# Patient Record
Sex: FEMALE | Race: WHITE | NOT HISPANIC OR LATINO | Employment: FULL TIME | URBAN - METROPOLITAN AREA
[De-identification: names, ages, dates, MRNs, and addresses within clinical notes are randomized per-mention and may not be internally consistent; named-entity substitution may affect disease eponyms.]

---

## 2017-01-16 ENCOUNTER — HOSPITAL ENCOUNTER (OUTPATIENT)
Dept: NON INVASIVE DIAGNOSTICS | Facility: HOSPITAL | Age: 52
Discharge: HOME | End: 2017-01-16
Payer: COMMERCIAL

## 2017-01-16 DIAGNOSIS — R07.89 CHEST TIGHTNESS: ICD-10-CM

## 2017-01-16 PROCEDURE — 93306 TTE W/DOPPLER COMPLETE: CPT

## 2017-02-16 ENCOUNTER — ALLSCRIPTS OFFICE VISIT (OUTPATIENT)
Dept: OTHER | Facility: OTHER | Age: 52
End: 2017-02-16

## 2017-02-19 ENCOUNTER — GENERIC CONVERSION - ENCOUNTER (OUTPATIENT)
Dept: OTHER | Facility: OTHER | Age: 52
End: 2017-02-19

## 2017-02-19 ENCOUNTER — HOSPITAL ENCOUNTER (EMERGENCY)
Facility: HOSPITAL | Age: 52
Discharge: HOME | End: 2017-02-19
Attending: EMERGENCY MEDICINE | Admitting: EMERGENCY MEDICINE
Payer: COMMERCIAL

## 2017-02-19 VITALS
RESPIRATION RATE: 16 BRPM | BODY MASS INDEX: 23.05 KG/M2 | OXYGEN SATURATION: 98 % | HEART RATE: 100 BPM | DIASTOLIC BLOOD PRESSURE: 76 MMHG | WEIGHT: 135 LBS | TEMPERATURE: 98.2 F | HEIGHT: 64 IN | SYSTOLIC BLOOD PRESSURE: 133 MMHG

## 2017-02-19 DIAGNOSIS — J02.8 SORE THROAT (VIRAL): Primary | ICD-10-CM

## 2017-02-19 DIAGNOSIS — B97.89 SORE THROAT (VIRAL): Primary | ICD-10-CM

## 2017-02-19 LAB — S PYO AG THROAT QL: NEGATIVE

## 2017-02-19 PROCEDURE — 87070 CULTURE OTHR SPECIMN AEROBIC: CPT | Performed by: EMERGENCY MEDICINE

## 2017-02-19 PROCEDURE — 87430 STREP A AG IA: CPT | Performed by: EMERGENCY MEDICINE

## 2017-02-19 PROCEDURE — A9270 NON-COVERED ITEM OR SERVICE: HCPCS | Performed by: EMERGENCY MEDICINE

## 2017-02-19 PROCEDURE — 99283 EMERGENCY DEPT VISIT LOW MDM: CPT

## 2017-02-19 RX ORDER — NAPROXEN 500 MG/1
500 TABLET ORAL 2 TIMES DAILY WITH MEALS
Qty: 10 TABLET | Refills: 0 | Status: SHIPPED | OUTPATIENT
Start: 2017-02-19 | End: 2018-11-29

## 2017-02-19 RX ORDER — NAPROXEN 500 MG/1
500 TABLET ORAL ONCE
Status: COMPLETED | OUTPATIENT
Start: 2017-02-19 | End: 2017-02-19

## 2017-02-19 RX ORDER — HYDROCODONE BITARTRATE AND ACETAMINOPHEN 5; 325 MG/1; MG/1
1 TABLET ORAL EVERY 8 HOURS PRN
Qty: 10 TABLET | Refills: 0 | Status: SHIPPED | OUTPATIENT
Start: 2017-02-19 | End: 2017-02-22

## 2017-02-19 RX ORDER — BENZONATATE 100 MG/1
100 CAPSULE ORAL 3 TIMES DAILY PRN
COMMUNITY
End: 2018-11-29

## 2017-02-19 RX ORDER — HYDROCODONE BITARTRATE AND ACETAMINOPHEN 5; 325 MG/1; MG/1
1 TABLET ORAL ONCE
Status: COMPLETED | OUTPATIENT
Start: 2017-02-19 | End: 2017-02-19

## 2017-02-19 RX ORDER — OSELTAMIVIR PHOSPHATE 75 MG/1
75 CAPSULE ORAL 2 TIMES DAILY
COMMUNITY
End: 2018-11-29

## 2017-02-19 RX ADMIN — DEXAMETHASONE SODIUM PHOSPHATE 10 MG: 10 INJECTION INTRAMUSCULAR; INTRAVENOUS at 02:09

## 2017-02-19 RX ADMIN — HYDROCODONE BITARTRATE AND ACETAMINOPHEN 1 TABLET: 5; 325 TABLET ORAL at 02:19

## 2017-02-19 RX ADMIN — NAPROXEN 500 MG: 500 TABLET ORAL at 02:09

## 2017-02-21 LAB — BACTERIA THROAT CULT: NORMAL

## 2017-02-22 ENCOUNTER — ALLSCRIPTS OFFICE VISIT (OUTPATIENT)
Dept: OTHER | Facility: OTHER | Age: 52
End: 2017-02-22

## 2017-08-15 ENCOUNTER — ALLSCRIPTS OFFICE VISIT (OUTPATIENT)
Dept: OTHER | Facility: OTHER | Age: 52
End: 2017-08-15

## 2017-12-19 ENCOUNTER — ALLSCRIPTS OFFICE VISIT (OUTPATIENT)
Dept: OTHER | Facility: OTHER | Age: 52
End: 2017-12-19

## 2017-12-20 NOTE — PROGRESS NOTES
Assessment  1  Common migraine without aura (346 10) (G43 009)  2  Body mass index (BMI) 24 0-24 9, adult (V85 1) (Z68 24)  3  CAD (coronary artery disease) (414 00) (I25 10)    Plan  Common migraine without aura    · Start: Imitrex 20 MG/ACT Nasal Solution (SUMAtriptan); USE 1 SPRAY INTO ONENOSTRIL AS NEEDED FOR MIGRAINE RELIEF, MAY REPEAT ONCE AFTER 2 HOURS  MAX - 40MG/DAY   · Start: Topiramate 25 MG Oral Tablet; 1 Every Day At Bedtime   · Drink plenty of fluids ; Status:Complete;   Done: 34FSK1691   · Shared Decision Making Aid given; Status:Complete;   Done: 95SSK3791    Discussion/Summary  The patient was counseled regarding risk factor reductions,-- impressions,-- risks and benefits of treatment options  Possible side effects of new medications were reviewed with the patient/guardian today  The treatment plan was reviewed with the patient/guardian  The patient/guardian understands and agrees with the treatment plan      Provider Comments  Provider Comments:   if not tolerated, could next offer propranolol and neurologistrecheck in about 2wpossible imaging in future if no betterCAD- stable, cardio f/u for statin options since she is not taking as recommendedBZD not prescribed or suggested      Chief Complaint  pt c/o migraine 3 times a week   pt would like medication ac/cma      History of Present Illness  HPI: 3x/wkmigraines for yearsmore often latelyneurologist in pastmigraine typeimitrex in past, nasal helpedtriggers shayna stress and sunlightlasts over 24hrsno auranausealight sensitiven/v alsosharp right foreheadsome also on left side, [facility] with CADgiven lipitor - stopped because made her drowsy and headaches, never retriedasa 81mg/d but not takinganxietybeen on meds in pasttried celexa, zoloft, wellbutrin, ativan, xanax for anxiety but did not like how she feltnot ready for another med or psych referral at this time      Review of Systems   Constitutional: no fever-- and-- no chills  Neurological: headache, but-- no fainting  Active Problems  1  Anxiety (300 00) (F41 9)  2  Body mass index (BMI) 24 0-24 9, adult (V85 1) (Z68 24)  3  Encounter for routine pelvic examination (V72 31) (Z01 419)  4  Encounter for screening mammogram for malignant neoplasm of breast (V76 12) (Z12 31)  5  Migraine headache (346 90) (G43 909)  6  Never A Smoker    Past Medical History  1  Acute upper respiratory infection (465 9) (J06 9)  2  Cough (786 2) (R05)    Family History  Father   1  Family history of Stroke Syndrome (V17 1)  Maternal Aunt   2  Family history of Breast Cancer (V16 3)  Family History Reviewed: The family history was reviewed and updated today  Social History   · Never A Smoker  The social history was reviewed and updated today  Surgical History  1  History of Salpingectomy For Ectopic Pregnancy    Current Meds  1  Aspirin EC 81 MG Oral Tablet Delayed Release; 1 every other day; Therapy: [id number] to Recorded    Allergies  1  Penicillins    Vitals   Recorded: [id number] 05:12PM   Temperature 97 7 F   Heart Rate 76   Respiration 16   Systolic 334   Diastolic 70   Height 5 ft 4 in   Weight 140 lb    BMI Calculated 24 03   BSA Calculated 1 68     Physical Exam   Constitutional  General appearance: No acute distress, well appearing and well nourished  Eyes  Conjunctiva and lids: No swelling, erythema or discharge  Pupils and irises: Equal, round and reactive to light  Ears, Nose, Mouth, and Throat  External inspection of ears and nose: Normal    Otoscopic examination: Tympanic membranes translucent with normal light reflex  Canals patent without erythema  Nasal mucosa, septum, and turbinates: Normal without edema or erythema  Oropharynx: Normal with no erythema, edema, exudate or lesions  Pulmonary  Respiratory effort: No increased work of breathing or signs of respiratory distress     Auscultation of lungs: Clear to auscultation  Cardiovascular  Auscultation of heart: Normal rate and rhythm, normal S1 and S2, without murmurs  Examination of extremities for edema and/or varicosities: Normal    Carotid pulses: Normal    Lymphatic  Palpation of lymph nodes in neck: No lymphadenopathy  Musculoskeletal  Gait and station: Normal    Digits and nails: Normal without clubbing or cyanosis  Inspection/palpation of joints, bones, and muscles: Normal    Skin  Skin and subcutaneous tissue: Normal without rashes or lesions  Neurologic  Cranial nerves: Cranial nerves 2-12 intact  Reflexes: 2+ and symmetric  Sensation: No sensory loss  Psychiatric  Mood and affect: Normal          Message   Return to work or school: Joi Lyons is under my professional care   She was seen in my office on1  12/19/17 1               1 Amended By: Billy Min ; Dec 19 2017 9:05 PM EST    Signatures   Electronically signed by : Lam Stevens DO; Dec 19 2017  9:05PM EST                       (Author)

## 2018-01-12 NOTE — MISCELLANEOUS
Message  Message Free Text Note Form: 1:10am Michelle Ahumada called concerned about her throat  She is being treated for the flu and is also taking Tessalon  She can't stop coughing and her throat feels like it is closing  She is worried about what will happen over the next 8 hours  She thinks she may need an antibiotic in addition to her current treatment  I told her I couldn't be sure what she needs without seeing her throat and recommended that she go to the Emergency room for evaluation  She agreed to go to get her throat checked out  I told her you can't take a chance with your airway  At the time we spoke she was not short of breath or wheezing  She denied any lip or tongue swelling  She felt like the glands at the back of her mouth/top of her throat were swollen        Signatures   Electronically signed by : Marito Lechuga DO; Feb 19 2017 10:02AM EST                       (Author)

## 2018-01-13 VITALS
TEMPERATURE: 97.8 F | HEART RATE: 72 BPM | WEIGHT: 136 LBS | RESPIRATION RATE: 16 BRPM | BODY MASS INDEX: 23.22 KG/M2 | SYSTOLIC BLOOD PRESSURE: 106 MMHG | HEIGHT: 64 IN | DIASTOLIC BLOOD PRESSURE: 70 MMHG

## 2018-01-13 VITALS
HEIGHT: 64 IN | BODY MASS INDEX: 24.07 KG/M2 | DIASTOLIC BLOOD PRESSURE: 78 MMHG | RESPIRATION RATE: 18 BRPM | TEMPERATURE: 98 F | SYSTOLIC BLOOD PRESSURE: 106 MMHG | HEART RATE: 78 BPM | WEIGHT: 141 LBS

## 2018-01-14 VITALS
DIASTOLIC BLOOD PRESSURE: 80 MMHG | RESPIRATION RATE: 20 BRPM | WEIGHT: 139 LBS | SYSTOLIC BLOOD PRESSURE: 110 MMHG | HEIGHT: 64 IN | BODY MASS INDEX: 23.73 KG/M2 | HEART RATE: 82 BPM | TEMPERATURE: 97.2 F

## 2018-01-23 VITALS
HEART RATE: 76 BPM | HEIGHT: 64 IN | BODY MASS INDEX: 23.9 KG/M2 | TEMPERATURE: 97.7 F | WEIGHT: 140 LBS | DIASTOLIC BLOOD PRESSURE: 70 MMHG | RESPIRATION RATE: 16 BRPM | SYSTOLIC BLOOD PRESSURE: 112 MMHG

## 2018-01-23 NOTE — MISCELLANEOUS
Message  Return to work or school:   Karenrick Stock is under my professional care  She was seen in my office on 12/19/17               Signatures   Electronically signed by : Isaiah Mabry DO; Dec 19 2017  9:05PM EST                       (Author)

## 2018-02-14 DIAGNOSIS — G43.909 MIGRAINE WITHOUT STATUS MIGRAINOSUS, NOT INTRACTABLE, UNSPECIFIED MIGRAINE TYPE: Primary | ICD-10-CM

## 2018-02-14 RX ORDER — SUMATRIPTAN 20 MG/1
1 SPRAY NASAL
COMMUNITY
Start: 2015-11-18 | End: 2018-02-14 | Stop reason: SDUPTHER

## 2018-02-15 DIAGNOSIS — G43.909 MIGRAINE WITHOUT STATUS MIGRAINOSUS, NOT INTRACTABLE, UNSPECIFIED MIGRAINE TYPE: ICD-10-CM

## 2018-02-15 PROBLEM — E78.2 MULTIPLE-TYPE HYPERLIPIDEMIA: Status: ACTIVE | Noted: 2017-02-03

## 2018-02-15 PROBLEM — G43.009 COMMON MIGRAINE WITHOUT AURA: Status: ACTIVE | Noted: 2017-12-19

## 2018-02-15 PROBLEM — Z82.49 FAMILY HISTORY OF CORONARY ARTERY DISEASE: Status: ACTIVE | Noted: 2017-02-03

## 2018-02-15 PROBLEM — I25.10 CAD (CORONARY ARTERY DISEASE): Status: ACTIVE | Noted: 2017-12-19

## 2018-02-15 RX ORDER — SUMATRIPTAN 20 MG/1
SPRAY NASAL
Qty: 10 INHALER | Refills: 5 | Status: SHIPPED | OUTPATIENT
Start: 2018-02-15 | End: 2019-04-08 | Stop reason: SDUPTHER

## 2018-02-15 RX ORDER — ALPRAZOLAM 0.25 MG/1
1 TABLET ORAL
COMMUNITY
Start: 2017-08-15 | End: 2018-11-29

## 2018-02-15 RX ORDER — ASPIRIN 81 MG/1
TABLET ORAL EVERY OTHER DAY
COMMUNITY
Start: 2017-08-15 | End: 2018-11-29

## 2018-02-15 RX ORDER — CITALOPRAM 10 MG/1
1 TABLET ORAL DAILY
COMMUNITY
Start: 2017-08-15 | End: 2018-11-29

## 2018-02-15 RX ORDER — ONDANSETRON 4 MG/1
TABLET, ORALLY DISINTEGRATING ORAL
COMMUNITY
Start: 2017-08-15 | End: 2018-11-29

## 2018-02-15 RX ORDER — TOPIRAMATE 25 MG/1
TABLET ORAL
COMMUNITY
Start: 2017-12-19 | End: 2018-11-29

## 2018-11-29 ENCOUNTER — OFFICE VISIT (OUTPATIENT)
Dept: URGENT CARE | Facility: CLINIC | Age: 53
End: 2018-11-29
Payer: COMMERCIAL

## 2018-11-29 VITALS
RESPIRATION RATE: 18 BRPM | HEART RATE: 82 BPM | SYSTOLIC BLOOD PRESSURE: 100 MMHG | TEMPERATURE: 97 F | OXYGEN SATURATION: 100 % | BODY MASS INDEX: 25.61 KG/M2 | DIASTOLIC BLOOD PRESSURE: 70 MMHG | WEIGHT: 150 LBS | HEIGHT: 64 IN

## 2018-11-29 DIAGNOSIS — G44.209 ACUTE NON INTRACTABLE TENSION-TYPE HEADACHE: Primary | ICD-10-CM

## 2018-11-29 PROCEDURE — 99213 OFFICE O/P EST LOW 20 MIN: CPT | Performed by: PHYSICIAN ASSISTANT

## 2018-11-29 RX ORDER — CYCLOBENZAPRINE HCL 5 MG
5 TABLET ORAL
Qty: 15 TABLET | Refills: 0 | Status: SHIPPED | OUTPATIENT
Start: 2018-11-29 | End: 2018-12-18 | Stop reason: SDUPTHER

## 2018-11-29 NOTE — PROGRESS NOTES
[facility]        NAME: Beck García is a 48 y o  female  : 1965    MRN: [id number]  DATE: 2018  TIME: 4:23 PM    Assessment and Plan   Acute non intractable tension-type headache [G44 209]  1  Acute non intractable tension-type headache  cyclobenzaprine (FLEXERIL) 5 mg tablet     Patient Instructions   Take the muscle relaxant as directed  Do not take this medication prior to having to be alert or perform activities that require full level of attention as it can make you drowsy  Continue ibuprofen, taking only as directed  Take this medication with food and water  Apply a warm compress to your head and neck for 20-30 minutes at a time, 3-4 times per day  Massage areas of headache and discomfort  Find stress leaving activities such as exercise, yoga, meditation, etc  Increase water intake to at least 8 cups of water daily  Aim for 8 hours of sleep nightly  Follow up with a family doctor and a neurologist in 3-5 days  Proceed to the ER if symptoms worsen  Patient provided with an infolink card and contact information for local PCPs  Encouraged her to use [employer] card and handout provided to find a family doctor and neurologist  All questions answered  Precautions given  Patient verbalized understanding and agreement with the treatment plan  Chief Complaint     Chief Complaint   Patient presents with    Migraine     Has been awakening with HA/migraines x 12 days  Out of Sumatriptan NS since Friday  Took Advil - 6 tabs today at 9:30 - HA resolved late afternoon  No facial pressure, vertigo, visual changes but is light sensitive and has nausea  History of Present Illness   49 y/o female presenting with c/o migraines x one and a half weeks  Patient notes she awakens with a headache every morning that typically resolves throughout the day but occasionally lasts at a persistent rate and severity until dinner or bed time   She notes she has had migraines without aura since she was in her 19's, but has struggled to control headache over the past week  She notes current headache pattern is different than her chronic migraines noting sharp spike like feeling over her eyebrows  This sensation could be uni or bilateral depending on the day, and is often but not always associated with a pulling, squeezing or stabbing sensation that wraps like a c around the top of her head  She notes headache today is located on both sides but is isolated to the forehead alone  She took 6 OTC tablets of ibuprofen at 9:30 this am, which reportedly relieved pain  She is currently only experiencing the "fog" that she feels after a headache, described as fatigue and just feeling out of it  Headaches are disruptive of her daily activities, noting that they have occasionally caused her to vomit out of pain, caused fatigue, and light sensitivity  Over the week she admits to only a single episode of vomiting  She denies being on any maintenance therapies for migraines, noting she is sensitive to the side effects of various medications, and thus chooses to only take what is absolutely necessary  Sumatriptan had been working for her as an abortive medicine, however, she stopped taking it over current illness due to it not making a difference in headache symptoms  She notes she was headache free yesterday, which was the first time since onset that she had a day without headache  She denies headaches ever coming on throughout the day, noting she only wakes up with them  In the past light, stress, lack of sleep, and milk have all been triggers for migraines  She notes she is having difficulty sleeping, noting she wakes up at 3am every morning unable to return to sleep and has been under a significant amount of stress lately  She denies any recent head injury, trauma, fall, hx of stroke, or hx of MI   She does not currently follow with a family physician or neurologist       Review of Systems   Review of Systems Constitutional: Negative for chills, fatigue and fever  HENT: Negative for congestion, ear pain, hearing loss, rhinorrhea, sore throat, tinnitus and trouble swallowing  Eyes: Positive for photophobia  Negative for visual disturbance  Respiratory: Negative for cough, shortness of breath and wheezing  Cardiovascular: Negative for chest pain and palpitations  Gastrointestinal: Negative for abdominal pain, diarrhea, nausea and vomiting  Musculoskeletal: Negative for arthralgias and myalgias  Skin: Negative for rash  Neurological: Negative for dizziness, syncope, facial asymmetry, speech difficulty, weakness, light-headedness, numbness and headaches  Psychiatric/Behavioral: Positive for sleep disturbance  Negative for confusion and decreased concentration  Current Medications       Current Outpatient Prescriptions:     SUMAtriptan (IMITREX) 20 MG/ACT nasal spray, One spray prn migraine, may repeat once more in 2hrs if needed, 40mg max/d, Disp: 10 Inhaler, Rfl: 5    cyclobenzaprine (FLEXERIL) 5 mg tablet, Take 1 tablet (5 mg total) by mouth daily at bedtime, Disp: 15 tablet, Rfl: 0    Current Allergies     Allergies as of 11/29/2018 - Reviewed 11/29/2018   Allergen Reaction Noted    Penicillins Hives 02/19/2017          The following portions of the patient's history were reviewed and updated as appropriate: allergies, current medications, past family history, past medical history, past social history, past surgical history and problem list      Past Medical History:   Diagnosis Date    CAD (coronary artery disease)     Migraine      Past Surgical History:   Procedure Laterality Date    BREAST SURGERY Right     lumpectomy - benign    ECTOPIC PREGNANCY SURGERY      KNEE SURGERY Right        No family history on file  Medications have been verified      Objective   /70 (BP Location: Left arm, Patient Position: Sitting, Cuff Size: Standard)   Pulse 82   Temp (!) 97 °F (36 1 °C) (Tympanic)   Resp 18   Ht 5' 4" (1 626 m)   Wt 68 kg (150 lb)   SpO2 100%   BMI 25 75 kg/m²     Physical Exam     Physical Exam   Constitutional: She is oriented to person, place, and time  Vital signs are normal  She appears well-developed and well-nourished  She is cooperative  She does not appear ill  No distress  HENT:   Head: Normocephalic and atraumatic  Head is without raccoon's eyes, without Samaniego's sign, without abrasion, without contusion and without laceration  Right Ear: Hearing, tympanic membrane, external ear and ear canal normal    Left Ear: Hearing, tympanic membrane, external ear and ear canal normal    Nose: Nose normal  No mucosal edema, rhinorrhea or nasal septal hematoma  No epistaxis  Mouth/Throat: Uvula is midline, oropharynx is clear and moist and mucous membranes are normal  Mucous membranes are not pale, not dry and not cyanotic  No oral lesions  No trismus in the jaw  No uvula swelling  No oropharyngeal exudate, posterior oropharyngeal edema or posterior oropharyngeal erythema  Eyes: Conjunctivae and lids are normal  Right eye exhibits no discharge  Left eye exhibits no discharge  No scleral icterus  Neck: Trachea normal and phonation normal  Neck supple  No spinous process tenderness and no muscular tenderness present  No neck rigidity  No tracheal deviation, no edema and normal range of motion present  Palpable cervical PSM and trapezius muscle spasm BL  Cardiovascular: Normal rate and regular rhythm  Exam reveals friction rub  Exam reveals no gallop  No murmur heard  Pulmonary/Chest: Effort normal and breath sounds normal  No stridor  No respiratory distress  She has no decreased breath sounds  She has no wheezes  She has no rhonchi  She has no rales  Abdominal: Soft  Bowel sounds are normal  She exhibits no distension  There is no tenderness  Lymphadenopathy:     She has no cervical adenopathy     Neurological: She is alert and oriented to person, place, and time  She has normal strength and normal reflexes  No cranial nerve deficit or sensory deficit  She exhibits normal muscle tone  Coordination and gait normal  GCS eye subscore is 4  GCS verbal subscore is 5  GCS motor subscore is 6  Skin: Skin is warm and dry  No rash noted  She is not diaphoretic  No erythema  No pallor  Psychiatric: She has a normal mood and affect  Her speech is normal and behavior is normal  Judgment and thought content normal    Nursing note and vitals reviewed

## 2018-11-29 NOTE — PATIENT INSTRUCTIONS
Take the muscle relaxant as directed  Do not take this medication prior to having to be alert or perform activities that require full level of attention as it can make you drowsy  Continue ibuprofen, taking only as directed  Take this medication with food and water  Apply a warm compress to your head and neck for 20-30 minutes at a time, 3-4 times per day  Massage areas of headache and discomfort  Find stress leaving activities such as exercise, yoga, meditation, etc  Increase water intake to at least 8 cups of water daily  Aim for 8 hours of sleep nightly  Follow up with a family doctor and a neurologist in 3-5 days  Proceed to the ER if symptoms worsen

## 2018-12-18 ENCOUNTER — OFFICE VISIT (OUTPATIENT)
Dept: FAMILY MEDICINE CLINIC | Facility: CLINIC | Age: 53
End: 2018-12-18
Payer: COMMERCIAL

## 2018-12-18 VITALS
SYSTOLIC BLOOD PRESSURE: 112 MMHG | HEART RATE: 96 BPM | OXYGEN SATURATION: 99 % | TEMPERATURE: 97.4 F | RESPIRATION RATE: 12 BRPM | WEIGHT: 148 LBS | DIASTOLIC BLOOD PRESSURE: 76 MMHG | BODY MASS INDEX: 25.4 KG/M2

## 2018-12-18 DIAGNOSIS — E78.2 MIXED HYPERLIPIDEMIA: ICD-10-CM

## 2018-12-18 DIAGNOSIS — I25.10 CORONARY ARTERY DISEASE INVOLVING NATIVE HEART WITHOUT ANGINA PECTORIS, UNSPECIFIED VESSEL OR LESION TYPE: ICD-10-CM

## 2018-12-18 DIAGNOSIS — G43.909 MIGRAINE WITHOUT STATUS MIGRAINOSUS, NOT INTRACTABLE, UNSPECIFIED MIGRAINE TYPE: Primary | ICD-10-CM

## 2018-12-18 DIAGNOSIS — E55.9 VITAMIN D DEFICIENCY: ICD-10-CM

## 2018-12-18 DIAGNOSIS — G44.209 ACUTE NON INTRACTABLE TENSION-TYPE HEADACHE: ICD-10-CM

## 2018-12-18 PROCEDURE — 99214 OFFICE O/P EST MOD 30 MIN: CPT | Performed by: NURSE PRACTITIONER

## 2018-12-18 PROCEDURE — 1036F TOBACCO NON-USER: CPT | Performed by: NURSE PRACTITIONER

## 2018-12-18 RX ORDER — CYCLOBENZAPRINE HCL 5 MG
5 TABLET ORAL
Qty: 30 TABLET | Refills: 0 | Status: SHIPPED | OUTPATIENT
Start: 2018-12-18 | End: 2019-03-18 | Stop reason: SDUPTHER

## 2018-12-18 NOTE — PATIENT INSTRUCTIONS
Flexeril as needed for headache/neck tension  Try heat, massage  Monitor headaches  Will check labs

## 2018-12-18 NOTE — PROGRESS NOTES
Assessment/Plan:  1  Acute non intractable tension-type headache  Currently stable  Flexeril prn  Discussed use of heat and massage    - cyclobenzaprine (FLEXERIL) 5 mg tablet; Take 1 tablet (5 mg total) by mouth daily at bedtime as needed for muscle spasms  Dispense: 30 tablet; Refill: 0    2  Migraine without status migrainosus, not intractable, unspecified migraine type  Stable  Track headaches, diary  Will check labs  - CBC and differential; Future  - Comprehensive metabolic panel; Future    3  Coronary artery disease involving native heart without angina pectoris, unspecified vessel or lesion type  Stable  Will check labs  - CBC and differential; Future  - Comprehensive metabolic panel; Future    4  Mixed hyperlipidemia  Will check labs  Heart healthy diet  - Lipid panel; Future    5  Vitamin D deficiency  Will check labs  - Vitamin D 25 hydroxy; Future             Subjective:      Patient ID: Celia Blue is a 48 y o  female who presents for migraine follow up from urgent care  Here to establish  Has ongoing issues with migraines  Was seen in urgent care last week and was treated for tension headache with flexeril  Took the flexeril at bed time for a few days and helped tremendously  Did not take the other night and woke up with migraine  Very stressed  No recent medical care  No recent labs  Has cardiac history and sees cardio in [place] but would like to change due to distance  Currently with no headache  No neck pain  No visual disturbance  History of high chol, supposed to be on lipitor but has not been taking  History of vitamin d def  The following portions of the patient's history were reviewed and updated as appropriate: allergies, current medications, past family history, past medical history, past social history, past surgical history and problem list     Review of Systems   Constitutional: Negative for fatigue  Eyes: Negative for photophobia and visual disturbance  Respiratory: Negative for shortness of breath  Cardiovascular: Negative for chest pain and palpitations  Gastrointestinal: Negative for nausea and vomiting  Musculoskeletal: Negative for myalgias, neck pain and neck stiffness  Skin: Negative for rash  Neurological: Negative for dizziness and headaches (not currently, but ongoing issues with headache )  Psychiatric/Behavioral: The patient is nervous/anxious (history of anxiety  a lot of stress)  Objective:      /76 (BP Location: Right arm)   Pulse 96   Temp (!) 97 4 °F (36 3 °C) (Temporal)   Resp 12   Wt 67 1 kg (148 lb)   SpO2 99%   BMI 25 40 kg/m²          Physical Exam   Constitutional: She is oriented to person, place, and time  She appears well-developed and well-nourished  No distress  Neck: Normal range of motion  Neck supple  Cardiovascular: Normal rate, regular rhythm and normal heart sounds  No murmur heard  Pulmonary/Chest: Effort normal and breath sounds normal  No respiratory distress  She has no wheezes  Neurological: She is alert and oriented to person, place, and time  No focality   Skin: Skin is warm and dry  No rash noted  No erythema  Psychiatric: She has a normal mood and affect  Her behavior is normal  Judgment and thought content normal    Vitals reviewed

## 2019-01-14 LAB
25(OH)D3+25(OH)D2 SERPL-MCNC: 16.9 NG/ML (ref 30–100)
ALBUMIN SERPL-MCNC: 4.3 G/DL (ref 3.5–5.5)
ALBUMIN/GLOB SERPL: 1.5 {RATIO} (ref 1.2–2.2)
ALP SERPL-CCNC: 92 IU/L (ref 39–117)
ALT SERPL-CCNC: 26 IU/L (ref 0–32)
AST SERPL-CCNC: 25 IU/L (ref 0–40)
BASOPHILS # BLD AUTO: 0 X10E3/UL (ref 0–0.2)
BASOPHILS NFR BLD AUTO: 1 %
BILIRUB SERPL-MCNC: 0.3 MG/DL (ref 0–1.2)
BUN SERPL-MCNC: 14 MG/DL (ref 6–24)
BUN/CREAT SERPL: 24 (ref 9–23)
CALCIUM SERPL-MCNC: 9.7 MG/DL (ref 8.7–10.2)
CHLORIDE SERPL-SCNC: 103 MMOL/L (ref 96–106)
CHOLEST SERPL-MCNC: 252 MG/DL (ref 100–199)
CO2 SERPL-SCNC: 25 MMOL/L (ref 20–29)
CREAT SERPL-MCNC: 0.58 MG/DL (ref 0.57–1)
EOSINOPHIL # BLD AUTO: 0.2 X10E3/UL (ref 0–0.4)
EOSINOPHIL NFR BLD AUTO: 4 %
ERYTHROCYTE [DISTWIDTH] IN BLOOD BY AUTOMATED COUNT: 13.6 % (ref 12.3–15.4)
GLOBULIN SER-MCNC: 2.9 G/DL (ref 1.5–4.5)
GLUCOSE SERPL-MCNC: 85 MG/DL (ref 65–99)
HCT VFR BLD AUTO: 38.4 % (ref 34–46.6)
HDLC SERPL-MCNC: 91 MG/DL
HGB BLD-MCNC: 12.9 G/DL (ref 11.1–15.9)
IMM GRANULOCYTES # BLD: 0 X10E3/UL (ref 0–0.1)
IMM GRANULOCYTES NFR BLD: 0 %
LABCORP COMMENT: NORMAL
LDLC SERPL CALC-MCNC: 151 MG/DL (ref 0–99)
LYMPHOCYTES # BLD AUTO: 1.6 X10E3/UL (ref 0.7–3.1)
LYMPHOCYTES NFR BLD AUTO: 37 %
MCH RBC QN AUTO: 27.1 PG (ref 26.6–33)
MCHC RBC AUTO-ENTMCNC: 33.6 G/DL (ref 31.5–35.7)
MCV RBC AUTO: 81 FL (ref 79–97)
MONOCYTES # BLD AUTO: 0.3 X10E3/UL (ref 0.1–0.9)
MONOCYTES NFR BLD AUTO: 6 %
NEUTROPHILS # BLD AUTO: 2.3 X10E3/UL (ref 1.4–7)
NEUTROPHILS NFR BLD AUTO: 52 %
PLATELET # BLD AUTO: 235 X10E3/UL (ref 150–379)
POTASSIUM SERPL-SCNC: 4.5 MMOL/L (ref 3.5–5.2)
PROT SERPL-MCNC: 7.2 G/DL (ref 6–8.5)
RBC # BLD AUTO: 4.76 X10E6/UL (ref 3.77–5.28)
SL AMB EGFR AFRICAN AMERICAN: 122 ML/MIN/1.73
SL AMB EGFR NON AFRICAN AMERICAN: 106 ML/MIN/1.73
SL AMB VLDL CHOLESTEROL CALC: 10 MG/DL (ref 5–40)
SODIUM SERPL-SCNC: 140 MMOL/L (ref 134–144)
TRIGL SERPL-MCNC: 49 MG/DL (ref 0–149)
WBC # BLD AUTO: 4.3 X10E3/UL (ref 3.4–10.8)

## 2019-02-08 ENCOUNTER — TELEPHONE (OUTPATIENT)
Dept: FAMILY MEDICINE CLINIC | Facility: CLINIC | Age: 54
End: 2019-02-08

## 2019-02-08 NOTE — TELEPHONE ENCOUNTER
TASHA Old script per [facility] has Dr Dae Sagastume on it  If a PA will be needed when new prescription for Sumatriptan is ordered  Max of 54 sprays per 75 days  There is 6 sprays per bottle    Phone number for insurance company is [phone]  ID [id number]  [email or web address]

## 2019-02-08 NOTE — TELEPHONE ENCOUNTER
Called Pharmacy to find out the issue, they faxed over a rejection paper and the codes, called the insurance company at [phone] and spoke with a rep they advised per ins pt can only get a max of 54 sprays per every 75 days and she has exceeded the limit currently  They advised that they would only be allowed to put the prescription through for 18 sprays  Called the pharmacy back at [phone] and advised what the insurance had stated that it was not being approved I advised we try 2 sprays daily for 9 days which would total 18 sprays and the pharmacist stated he tried and that wasn't going through  I then called the insurance company back at [phone] and advised them what the pharmacy was stating  They ran a test claim and advised 18 sprays where going through on their end  They advised I have the pharmacist run it for 2 sprays daily max for 9 days  I advised I did that and still wouldn't go through   They insurance company said they didn't know what else to say because when they run it through in their test claims it was working

## 2019-02-08 NOTE — TELEPHONE ENCOUNTER
Called pharmacy again and they stated it is still not going through at this time  Advised I would notify pt, Called pt left message advising all I have done to get this to work and advise at this time the matter is now out of my hands

## 2019-02-20 ENCOUNTER — OFFICE VISIT (OUTPATIENT)
Dept: FAMILY MEDICINE CLINIC | Facility: CLINIC | Age: 54
End: 2019-02-20
Payer: COMMERCIAL

## 2019-02-20 VITALS
RESPIRATION RATE: 12 BRPM | SYSTOLIC BLOOD PRESSURE: 116 MMHG | HEIGHT: 65 IN | DIASTOLIC BLOOD PRESSURE: 58 MMHG | TEMPERATURE: 97.3 F | OXYGEN SATURATION: 99 % | WEIGHT: 148.2 LBS | BODY MASS INDEX: 24.69 KG/M2 | HEART RATE: 94 BPM

## 2019-02-20 DIAGNOSIS — G43.909 MIGRAINE WITHOUT STATUS MIGRAINOSUS, NOT INTRACTABLE, UNSPECIFIED MIGRAINE TYPE: ICD-10-CM

## 2019-02-20 DIAGNOSIS — E78.2 MULTIPLE-TYPE HYPERLIPIDEMIA: ICD-10-CM

## 2019-02-20 DIAGNOSIS — E55.9 VITAMIN D DEFICIENCY: ICD-10-CM

## 2019-02-20 DIAGNOSIS — Z00.00 ANNUAL PHYSICAL EXAM: Primary | ICD-10-CM

## 2019-02-20 PROCEDURE — 99396 PREV VISIT EST AGE 40-64: CPT | Performed by: NURSE PRACTITIONER

## 2019-02-20 RX ORDER — ROSUVASTATIN CALCIUM 5 MG/1
5 TABLET, COATED ORAL DAILY
Qty: 90 TABLET | Refills: 3 | Status: SHIPPED | OUTPATIENT
Start: 2019-02-20 | End: 2021-07-16 | Stop reason: HOSPADM

## 2019-02-20 NOTE — PROGRESS NOTES
FAMILY PRACTICE HEALTH MAINTENANCE OFFICE VISIT  [facility] - [address] [place] [address] PRACTICE    NAME: Bere Wyatt  AGE: 48 y o  SEX: female  : 1965     DATE: 2019    Assessment and Plan   1  Annual physical exam  Health maintenance discussed  Diet, exercise, weight management, stress management, etc    All questions addressed, answered, and pt verbalized understanding  Anticipatory guidance  2  Vitamin D deficiency  Start otc vitamin D3 2000 units daily  Will repeat labs in 3 months  - Vitamin D 1,25 dihydroxy; Future  3  Multiple-type hyperlipidemia  Heart healthy diet  Start Crestor 5mg daily  Fish oil 1000mg, 2 caps daily  - rosuvastatin (CRESTOR) 5 mg tablet; Take 1 tablet (5 mg total) by mouth daily  Dispense: 90 tablet; Refill: 3  - Comprehensive metabolic panel; Future  - Lipid panel; Future  4  Migraine without status migrainosus, not intractable, unspecified migraine type  Headache diary  Neuro eval    - Ambulatory referral to Neurology; Future    Patient was counseled regarding instructions for management which included: impression/diagnosis, risk/benefits of treatment options, importance of compliance with treatment, risk factor reduction, and prognosis  I have reviewed the instructions with the patient answering all questions and patient verbalized understanding  · Patient Counseling:   · Nutrition: Stressed importance of a well balanced diet, moderation of sodium/saturated fat, caloric balance and sufficient intake of fiber  · Exercise: Stressed the importance of regular exercise with a goal of 150 minutes per week  · Dental Health: Discussed daily flossing and brushing and regular dental visits   · Immunizations reviewed: up to date  · Discussed benefits of screening : reports had colon at age 48, unsure where  Will try to obtain info   BMI Counseling: Body mass index is 25 05 kg/m²  Discussed with patient's BMI with her  The BMI is above average   BMI counseling and education was provided to the patient  Nutrition recommendations include moderation in carbohydrate intake, reducing intake of saturated fat and trans fat and reducing intake of cholesterol  Exercise recommendations include exercising 3-5 times per week  Chief Complaint     Chief Complaint   Patient presents with    Annual Exam     Review Blood Work        History of Present Illness     Here for annual physical   Still struggling with migraines  At least 2-3 migraines per day  Taking flexeril intermittently  Has never seen neuro  History of cardiac issues but has not followed with cardio  Used to take something for chol , thinks may be lipitor, but did not tolerate  Tired all the time  Does not take any otc supplements or vitamins  Diagnosed with vit d def in past but not taking anything  Well Adult Physical   Patient here for a comprehensive physical exam       Diet and Physical Activity  Diet: well balanced diet  Exercise: never      Depression Screen  PHQ-9 Depression Screening    PHQ-9:    Frequency of the following problems over the past two weeks:       Little interest or pleasure in doing things:  0 - not at all  Feeling down, depressed, or hopeless:  0 - not at all  PHQ-2 Score:  0          General Health  Hearing: Normal:  bilateral  Vision: no vision problems  Dental: regular dental visits    Reproductive Health  Up to date gyn    mammo up to date  Sees breast surgeon      The following portions of the patient's history were reviewed and updated as appropriate: allergies, current medications, past family history, past medical history, past social history, past surgical history and problem list     Review of Systems     Review of Systems   Constitutional: Positive for fatigue  Negative for chills, diaphoresis and fever  HENT: Negative for congestion, ear pain, sinus pressure, sinus pain and sore throat  Eyes: Negative for visual disturbance     Respiratory: Negative for cough, chest tightness, shortness of breath and wheezing  Cardiovascular: Negative for chest pain, palpitations and leg swelling  Gastrointestinal: Negative for abdominal distention, abdominal pain, diarrhea and nausea  Genitourinary: Negative for dysuria, frequency and urgency  Musculoskeletal: Negative for arthralgias, back pain and myalgias  Neurological: Positive for headaches  Negative for dizziness and weakness  Hematological: Negative for adenopathy  Psychiatric/Behavioral: Negative for agitation  The patient is not nervous/anxious  All other systems reviewed and are negative        Past Medical History     Past Medical History:   Diagnosis Date    CAD (coronary artery disease)     Migraine        Past Surgical History     Past Surgical History:   Procedure Laterality Date    BREAST SURGERY Right     lumpectomy - benign    ECTOPIC PREGNANCY SURGERY      KNEE SURGERY Right        Social History     Social History     Socioeconomic History    Marital status:      Spouse name: None    Number of children: None    Years of education: None    Highest education level: None   Occupational History    None   Social Needs    Financial resource strain: None    Food insecurity:     Worry: None     Inability: None    Transportation needs:     Medical: None     Non-medical: None   Tobacco Use    Smoking status: Never Smoker    Smokeless tobacco: Never Used   Substance and Sexual Activity    Alcohol use: Yes     Comment: wine - once a month    Drug use: No    Sexual activity: None   Lifestyle    Physical activity:     Days per week: None     Minutes per session: None    Stress: None   Relationships    Social connections:     Talks on phone: None     Gets together: None     Attends Faith service: None     Active member of club or organization: None     Attends meetings of clubs or organizations: None     Relationship status: None    Intimate partner violence: Fear of current or ex partner: None     Emotionally abused: None     Physically abused: None     Forced sexual activity: None   Other Topics Concern    None   Social History Narrative    None       Family History     Family History   Problem Relation Age of Onset    Substance Abuse Neg Hx     Mental illness Neg Hx        Current Medications       Current Outpatient Medications:     cyclobenzaprine (FLEXERIL) 5 mg tablet, Take 1 tablet (5 mg total) by mouth daily at bedtime as needed for muscle spasms, Disp: 30 tablet, Rfl: 0    SUMAtriptan (IMITREX) 20 MG/ACT nasal spray, One spray prn migraine, may repeat once more in 2hrs if needed, 40mg max/d, Disp: 10 Inhaler, Rfl: 5     Allergies     Allergies   Allergen Reactions    Penicillins Hives       Objective     /58 (BP Location: Left arm, Patient Position: Sitting, Cuff Size: Standard)   Pulse 94   Temp (!) 97 3 °F (36 3 °C) (Temporal)   Resp 12   Ht 5' 4 5" (1 638 m)   Wt 67 2 kg (148 lb 3 2 oz)   SpO2 99%   BMI 25 05 kg/m²     Labs reviewed 1/12/19:  Vitamin D 16 9  CBC wnl  CMP wnl  Lipid: chol 252,      Physical Exam   Constitutional: She is oriented to person, place, and time  She appears well-developed and well-nourished  No distress  HENT:   Head: Normocephalic and atraumatic  Right Ear: External ear normal    Left Ear: External ear normal    Nose: Nose normal    Mouth/Throat: Oropharynx is clear and moist    Eyes: Pupils are equal, round, and reactive to light  EOM are normal  Right eye exhibits no discharge  Left eye exhibits no discharge  Neck: Normal range of motion  Neck supple  No thyromegaly present  Cardiovascular: Normal rate and regular rhythm  No audible carotid bruit  No JVD   Pulmonary/Chest: Effort normal and breath sounds normal  No respiratory distress  She has no wheezes  She has no rales  Abdominal: Soft  Bowel sounds are normal  She exhibits no distension  There is no tenderness     Musculoskeletal: Normal range of motion  She exhibits no edema, tenderness or deformity  Lymphadenopathy:     She has no cervical adenopathy  Neurological: She is alert and oriented to person, place, and time  No cranial nerve deficit  Skin: Skin is warm and dry  No rash noted  She is not diaphoretic  No erythema  No pallor  Psychiatric: She has a normal mood and affect  Her behavior is normal  Judgment and thought content normal    Vitals reviewed              Health Maintenance     Health Maintenance   Topic Date Due    Hepatitis C Screening  1965    CRC Screening: Colonoscopy  1965    BMI: Followup Plan  03/19/1983    Pneumococcal PPSV23 Medium Risk Adult (1 of 1 - PPSV23) 03/19/1984    INFLUENZA VACCINE  07/01/2018    BMI: Adult  12/18/2019    DTaP,Tdap,and Td Vaccines (2 - Td) 07/10/2023    HEPATITIS B VACCINES  Aged Out     Immunization History   Administered Date(s) Administered    Tdap 07/10/2013       Nini Anderson, [address] [place] [address]

## 2019-02-20 NOTE — PATIENT INSTRUCTIONS
Vitamin D3 2000 units daily  Calcium supplement (over the counter) 1000mg daily  Start a daily multivitamin  Fish oil 1000mg, 2 capsules daily  Start Crestor 5mg daily  Will repeat labs in 3 months  Heart healthy diet  Schedule appt with neurology to evaluate for migraines  Start headache diary and bring to your appt

## 2019-03-18 DIAGNOSIS — G44.209 ACUTE NON INTRACTABLE TENSION-TYPE HEADACHE: ICD-10-CM

## 2019-03-19 RX ORDER — CYCLOBENZAPRINE HCL 5 MG
5 TABLET ORAL
Qty: 30 TABLET | Refills: 0 | Status: SHIPPED | OUTPATIENT
Start: 2019-03-19 | End: 2019-07-22 | Stop reason: SDUPTHER

## 2019-04-08 DIAGNOSIS — G43.909 MIGRAINE WITHOUT STATUS MIGRAINOSUS, NOT INTRACTABLE, UNSPECIFIED MIGRAINE TYPE: ICD-10-CM

## 2019-04-08 RX ORDER — SUMATRIPTAN 20 MG/1
SPRAY NASAL
Qty: 10 INHALER | Refills: 5 | Status: SHIPPED | OUTPATIENT
Start: 2019-04-08 | End: 2019-09-25 | Stop reason: ALTCHOICE

## 2019-04-09 ENCOUNTER — TELEPHONE (OUTPATIENT)
Dept: NEUROLOGY | Facility: CLINIC | Age: 54
End: 2019-04-09

## 2019-04-30 ENCOUNTER — OFFICE VISIT (OUTPATIENT)
Dept: FAMILY MEDICINE CLINIC | Facility: CLINIC | Age: 54
End: 2019-04-30
Payer: COMMERCIAL

## 2019-04-30 VITALS
HEART RATE: 84 BPM | DIASTOLIC BLOOD PRESSURE: 72 MMHG | OXYGEN SATURATION: 98 % | RESPIRATION RATE: 16 BRPM | TEMPERATURE: 97.8 F | WEIGHT: 147 LBS | SYSTOLIC BLOOD PRESSURE: 114 MMHG | HEIGHT: 65 IN | BODY MASS INDEX: 24.49 KG/M2

## 2019-04-30 DIAGNOSIS — G43.719 INTRACTABLE CHRONIC MIGRAINE WITHOUT AURA AND WITHOUT STATUS MIGRAINOSUS: Primary | ICD-10-CM

## 2019-04-30 PROCEDURE — 99214 OFFICE O/P EST MOD 30 MIN: CPT | Performed by: NURSE PRACTITIONER

## 2019-04-30 PROCEDURE — 3008F BODY MASS INDEX DOCD: CPT | Performed by: NURSE PRACTITIONER

## 2019-04-30 RX ORDER — BUPROPION HYDROCHLORIDE 150 MG/1
TABLET ORAL
Refills: 3 | COMMUNITY
Start: 2019-02-13 | End: 2019-04-30 | Stop reason: HOSPADM

## 2019-04-30 RX ORDER — ASPIRIN 81 MG/1
81 TABLET ORAL 3 TIMES WEEKLY
COMMUNITY
End: 2019-09-25 | Stop reason: SDUPTHER

## 2019-05-16 ENCOUNTER — TELEPHONE (OUTPATIENT)
Dept: FAMILY MEDICINE CLINIC | Facility: CLINIC | Age: 54
End: 2019-05-16

## 2019-05-17 ENCOUNTER — CONSULT (OUTPATIENT)
Dept: NEUROLOGY | Facility: CLINIC | Age: 54
End: 2019-05-17
Payer: COMMERCIAL

## 2019-05-17 VITALS
SYSTOLIC BLOOD PRESSURE: 116 MMHG | DIASTOLIC BLOOD PRESSURE: 75 MMHG | BODY MASS INDEX: 24.84 KG/M2 | WEIGHT: 147 LBS | HEART RATE: 90 BPM

## 2019-05-17 DIAGNOSIS — G43.719 INTRACTABLE CHRONIC MIGRAINE WITHOUT AURA AND WITHOUT STATUS MIGRAINOSUS: ICD-10-CM

## 2019-05-17 DIAGNOSIS — G43.109 MIGRAINE WITH AURA AND WITHOUT STATUS MIGRAINOSUS, NOT INTRACTABLE: Primary | ICD-10-CM

## 2019-05-17 DIAGNOSIS — E55.9 VITAMIN D DEFICIENCY: ICD-10-CM

## 2019-05-17 DIAGNOSIS — G43.909 MIGRAINE WITHOUT STATUS MIGRAINOSUS, NOT INTRACTABLE, UNSPECIFIED MIGRAINE TYPE: ICD-10-CM

## 2019-05-17 DIAGNOSIS — G43.809 VESTIBULAR MIGRAINE: ICD-10-CM

## 2019-05-17 PROCEDURE — 99244 OFF/OP CNSLTJ NEW/EST MOD 40: CPT | Performed by: PHYSICIAN ASSISTANT

## 2019-05-17 RX ORDER — ERGOCALCIFEROL 1.25 MG/1
CAPSULE ORAL
Qty: 8 CAPSULE | Refills: 0 | Status: SHIPPED | OUTPATIENT
Start: 2019-05-17 | End: 2019-08-26 | Stop reason: ALTCHOICE

## 2019-05-17 RX ORDER — VERAPAMIL HYDROCHLORIDE 40 MG/1
TABLET ORAL
Qty: 60 TABLET | Refills: 2 | Status: SHIPPED | OUTPATIENT
Start: 2019-05-17 | End: 2020-06-22 | Stop reason: ALTCHOICE

## 2019-05-17 RX ORDER — KETOROLAC TROMETHAMINE 10 MG/1
10 TABLET, FILM COATED ORAL EVERY 6 HOURS PRN
Qty: 10 TABLET | Refills: 0 | Status: SHIPPED | OUTPATIENT
Start: 2019-05-17 | End: 2019-09-25 | Stop reason: ALTCHOICE

## 2019-06-26 ENCOUNTER — HOSPITAL ENCOUNTER (OUTPATIENT)
Dept: RADIOLOGY | Facility: HOSPITAL | Age: 54
Discharge: HOME | End: 2019-06-26
Payer: COMMERCIAL

## 2019-06-26 DIAGNOSIS — G43.719 INTRACTABLE CHRONIC MIGRAINE WITHOUT AURA AND WITHOUT STATUS MIGRAINOSUS: ICD-10-CM

## 2019-06-26 DIAGNOSIS — G43.809 VESTIBULAR MIGRAINE: ICD-10-CM

## 2019-06-26 DIAGNOSIS — G43.109 MIGRAINE WITH AURA AND WITHOUT STATUS MIGRAINOSUS, NOT INTRACTABLE: ICD-10-CM

## 2019-06-26 PROCEDURE — A9585 GADOBUTROL INJECTION: HCPCS | Performed by: PHYSICIAN ASSISTANT

## 2019-06-26 PROCEDURE — 70553 MRI BRAIN STEM W/O & W/DYE: CPT

## 2019-06-26 RX ADMIN — GADOBUTROL 7 ML: 604.72 INJECTION INTRAVENOUS at 12:00

## 2019-07-22 DIAGNOSIS — G44.209 ACUTE NON INTRACTABLE TENSION-TYPE HEADACHE: ICD-10-CM

## 2019-07-22 RX ORDER — CYCLOBENZAPRINE HCL 5 MG
5 TABLET ORAL
Qty: 30 TABLET | Refills: 0 | Status: SHIPPED | OUTPATIENT
Start: 2019-07-22 | End: 2020-06-22 | Stop reason: ALTCHOICE

## 2019-08-26 ENCOUNTER — OFFICE VISIT (OUTPATIENT)
Dept: NEUROLOGY | Facility: CLINIC | Age: 54
End: 2019-08-26
Payer: COMMERCIAL

## 2019-08-26 VITALS
HEART RATE: 101 BPM | BODY MASS INDEX: 24.49 KG/M2 | WEIGHT: 147 LBS | HEIGHT: 65 IN | DIASTOLIC BLOOD PRESSURE: 80 MMHG | SYSTOLIC BLOOD PRESSURE: 104 MMHG

## 2019-08-26 DIAGNOSIS — Z86.69 HISTORY OF MIGRAINE: ICD-10-CM

## 2019-08-26 DIAGNOSIS — G43.809 CERVICOGENIC MIGRAINE: Primary | ICD-10-CM

## 2019-08-26 DIAGNOSIS — M50.90 CERVICAL DISC DISORDER: ICD-10-CM

## 2019-08-26 PROCEDURE — 99214 OFFICE O/P EST MOD 30 MIN: CPT | Performed by: PSYCHIATRY & NEUROLOGY

## 2019-08-26 NOTE — PROGRESS NOTES
Return NeuroOutpatient Note        Chaim Bradley  [id number]  60 y o   1965       Migraine (MADHU-Roxana Adame )        History obtained from:  Patient     HPI/Subjective:  Chaim Bradley is a 48 yo F with PMH of migraines presents as follow up  She's had history of migraines since her 19's  She was seen by Louie Garner in May  At the time she was diagnosed with vestibular migraine  She had MRI brain IAC protocol which was normal  She was recently placed on flexeril by her PCP and says it does help  She does report a lot of neck tension, stress  She is sensitive to light still  Lack of sleep can trigger her headaches  She used to get 3-4 headaches a week  She now gets 1-2 headaches a week  Taking flexeril, resting helps  She doesn't report any disabling headaches anymore  She was placed on verapamil 40mg by Louie Garner but she stopped taking it as she doesn't like taking medication  In 20's, she had neck x rays and was found to have multilevel disc degenerative disease           Past Medical History:   Diagnosis Date    CAD (coronary artery disease)     Migraine      Social History     Socioeconomic History    Marital status:      Spouse name: Not on file    Number of children: Not on file    Years of education: Not on file    Highest education level: Not on file   Occupational History    Not on file   Social Needs    Financial resource strain: Not on file    Food insecurity:     Worry: Not on file     Inability: Not on file    Transportation needs:     Medical: Not on file     Non-medical: Not on file   Tobacco Use    Smoking status: Never Smoker    Smokeless tobacco: Never Used   Substance and Sexual Activity    Alcohol use: Yes     Comment: wine - once a month    Drug use: No    Sexual activity: Not on file   Lifestyle    Physical activity:     Days per week: Not on file     Minutes per session: Not on file    Stress: Not on file   Relationships    Social connections:     Talks on phone: Not on file     Gets together: Not on file     Attends Shinto service: Not on file     Active member of club or organization: Not on file     Attends meetings of clubs or organizations: Not on file     Relationship status: Not on file    Intimate partner violence:     Fear of current or ex partner: Not on file     Emotionally abused: Not on file     Physically abused: Not on file     Forced sexual activity: Not on file   Other Topics Concern    Not on file   Social History Narrative    Not on file     Family History   Problem Relation Age of Onset    Substance Abuse Neg Hx     Mental illness Neg Hx      Allergies   Allergen Reactions    Penicillins Hives     Current Outpatient Medications on File Prior to Visit   Medication Sig Dispense Refill    aspirin (ECOTRIN LOW STRENGTH) 81 mg EC tablet Take 81 mg by mouth 3 (three) times a week       cyclobenzaprine (FLEXERIL) 5 mg tablet Take 1 tablet (5 mg total) by mouth daily at bedtime as needed for muscle spasms 30 tablet 0    ketorolac (TORADOL) 10 mg tablet Take 1 tablet (10 mg total) by mouth every 6 (six) hours as needed (migraine) Max 2-3 per week  10 tablet 0    rosuvastatin (CRESTOR) 5 mg tablet Take 1 tablet (5 mg total) by mouth daily 90 tablet 3    SUMAtriptan (IMITREX) 20 MG/ACT nasal spray One spray prn migraine, may repeat once more in 2hrs if needed, 40mg max/d 10 Inhaler 5    verapamil (CALAN) 40 mg tablet 1 tab qhs x 5 days, then BID if tolerated  (Patient not taking: Reported on 8/26/2019) 60 tablet 2    [DISCONTINUED] ergocalciferol (VITAMIN D2) 50,000 units 1 tab weekly x 8 weeks, then 2000 units daily  8 capsule 0     No current facility-administered medications on file prior to visit  Review of Systems   Refer to positive review of systems in HPI     Review of Systems    Constitutional- No fever  Eyes- No visual change  ENT- Hearing normal  CV- No chest pain  Resp- No Shortness of breath  GI- No diarrhea  - Bladder normal  MS- No Arthritis   Skin- No rash  Psych- No depression  Endo- No DM  Heme- No nodes    Vitals:    08/26/19 1519   BP: 104/80   BP Location: Left arm   Patient Position: Sitting   Cuff Size: Adult   Pulse: 101   Weight: 66 7 kg (147 lb)   Height: 5' 4 5" (1 638 m)       PHYSICAL EXAM:  Appearance: No Acute Distress  Ophthalmoscopic: Disc Flat, Normal fundus  Mental status:  Orientation: Awake, Alert, and Orientedx3  Memory: Registation 3/3 Recall 3/3  Attention: normal  Knowledge: good  Language: No aphasia  Speech: No dysarthria  Cranial Nerves:  2 No Visual Defect on Confrontation, Pupils round, equal, reactive to light  3,4,6 Extraocular Movements Intact, no nystagmus  5 Facial Sensation Intact  7 No facial asymmetry  8 Intact hearing  9,10 Palate symmetric, normal gag  11 Good shoulder shrug  12 Tongue Midline  Gait: Stable  Coordination: No ataxia with finger to nose testing, and heel to shin  Sensory: Intact, Symmetric to pinprick, light touch, vibration, and joint position  Muscle Tone: Normal              Muscle exam:  Arm Right Left Leg Right Left   Deltoid 5/5 5/5 Iliopsoas 5/5 5/5   Biceps 5/5 5/5 Quads 5/5 5/5   Triceps 5/5 5/5 Hamstrings 5/5 5/5   Wrist Extension 5/5 5/5 Ankle Dorsi Flexion 5/5 5/5   Wrist Flexion 5/5 5/5 Ankle Plantar Flexion 5/5 5/5   Interossei 5/5 5/5 Ankle Eversion 5/5 5/5   APB 5/5 5/5 Ankle Inversion 5/5 5/5       Reflexes   RJ BJ TJ KJ AJ Plantars Valdez's   Right 2+ 2+ 2+ 2+ 2+ Downgoing Not present   Left 2+ 2+ 2+ 2+ 2+ Downgoing Not present     Personal review of  Labs:                  Diagnoses and all orders for this visit:      1  Cervicogenic migraine  Ambulatory referral to Physical Therapy    MRI cervical spine wo contrast   2  Cervical disc disorder  Ambulatory referral to Physical Therapy    MRI cervical spine wo contrast   3  History of migraine         It appears that since being on flexeril, she is doing better   Majority of her headaches seem to stem from neck tension  Will refer her for PT for neck and obtain one time cervical spine MRI  Asked her to inquire insurance about massage therapy  She may resume flexeril prn  In future if she reports more frequency of these types of headaches, will consider elavil for prevention               Total time of encounter:  30 min  More than 50% of the time was used in counseling and/or coordination of care  Extent of counseling and/or coordination of care        Harshad Rodriguez MD  [facility]  [address]  [address]  [phone]

## 2019-09-17 ENCOUNTER — TELEPHONE (OUTPATIENT)
Dept: FAMILY MEDICINE CLINIC | Facility: CLINIC | Age: 54
End: 2019-09-17

## 2019-09-17 NOTE — TELEPHONE ENCOUNTER
Pt called c/o chest pain up near her throat for that day  Pt denies any sob, dizziness, headache, jaw pain or numbness  Appt made for tomorrow and pt advised to go the ED if symptoms change or worsen  Pt aware and understands  No further action needed

## 2019-09-18 ENCOUNTER — OFFICE VISIT (OUTPATIENT)
Dept: FAMILY MEDICINE CLINIC | Facility: CLINIC | Age: 54
End: 2019-09-18
Payer: COMMERCIAL

## 2019-09-18 VITALS
WEIGHT: 147 LBS | OXYGEN SATURATION: 98 % | RESPIRATION RATE: 16 BRPM | DIASTOLIC BLOOD PRESSURE: 70 MMHG | HEIGHT: 64 IN | TEMPERATURE: 97.7 F | HEART RATE: 91 BPM | SYSTOLIC BLOOD PRESSURE: 114 MMHG | BODY MASS INDEX: 25.1 KG/M2

## 2019-09-18 DIAGNOSIS — I25.10 CORONARY ARTERY DISEASE INVOLVING NATIVE HEART WITHOUT ANGINA PECTORIS, UNSPECIFIED VESSEL OR LESION TYPE: ICD-10-CM

## 2019-09-18 DIAGNOSIS — E78.2 MULTIPLE-TYPE HYPERLIPIDEMIA: ICD-10-CM

## 2019-09-18 DIAGNOSIS — Z11.59 NEED FOR HEPATITIS C SCREENING TEST: ICD-10-CM

## 2019-09-18 DIAGNOSIS — E55.9 VITAMIN D DEFICIENCY: ICD-10-CM

## 2019-09-18 DIAGNOSIS — F41.9 ANXIETY: ICD-10-CM

## 2019-09-18 DIAGNOSIS — R07.89 CHEST DISCOMFORT: Primary | ICD-10-CM

## 2019-09-18 DIAGNOSIS — R73.01 ELEVATED FASTING GLUCOSE: ICD-10-CM

## 2019-09-18 DIAGNOSIS — K22.4 ESOPHAGEAL SPASM: ICD-10-CM

## 2019-09-18 PROCEDURE — 93000 ELECTROCARDIOGRAM COMPLETE: CPT | Performed by: NURSE PRACTITIONER

## 2019-09-18 PROCEDURE — 36415 COLL VENOUS BLD VENIPUNCTURE: CPT | Performed by: NURSE PRACTITIONER

## 2019-09-18 PROCEDURE — 99214 OFFICE O/P EST MOD 30 MIN: CPT | Performed by: NURSE PRACTITIONER

## 2019-09-18 RX ORDER — OMEPRAZOLE 40 MG/1
40 CAPSULE, DELAYED RELEASE ORAL DAILY
Qty: 30 CAPSULE | Refills: 0 | Status: SHIPPED | OUTPATIENT
Start: 2019-09-18 | End: 2021-01-14 | Stop reason: SDUPTHER

## 2019-09-18 NOTE — PATIENT INSTRUCTIONS
Will check labs  Start taking medications as directed  Keep appt with cardiology scheduled for today  Will start Omeprazole 40mg daily  Evaluation by gastroenterology as discussed  Go immediately to ED if symptoms worsen or new symptoms develop  Will re-evaluate in office next week

## 2019-09-18 NOTE — PROGRESS NOTES
Assessment/Plan:  1  Chest discomfort  EKG normal  ? Etiology  Suspect may be GI/esophageal spasm but given cardiac history, needs cardiac eval  Has appt with cardio today  - POCT ECG  - CBC and differential  - Comprehensive metabolic panel  - omeprazole (PriLOSEC) 40 MG capsule; Take 1 capsule (40 mg total) by mouth daily  Dispense: 30 capsule; Refill: 0  - Ambulatory referral to Gastroenterology; Future  2  Coronary artery disease involving native heart without angina pectoris, unspecified vessel or lesion type  - POCT ECG  - CBC and differential  - Comprehensive metabolic panel  3  Multiple-type hyperlipidemia  - CBC and differential  - Comprehensive metabolic panel  - Lipid panel  4  Anxiety  Stable  Discussed coping mechanisms  - CBC and differential  - Comprehensive metabolic panel  5  Vitamin D deficiency  - CBC and differential  - Comprehensive metabolic panel  - Vitamin D 25 hydroxy  6  Need for hepatitis C screening test  - CBC and differential  - Hepatitis C antibody  7  Elevated fasting glucose  - Hemoglobin A1C  8  Esophageal spasm  - omeprazole (PriLOSEC) 40 MG capsule; Take 1 capsule (40 mg total) by mouth daily  Dispense: 30 capsule; Refill: 0  - Ambulatory referral to Gastroenterology; Future    Will check labs  Start taking medications as directed  Keep appt with cardiology scheduled for today  Will start Omeprazole 40mg daily  Evaluation by gastroenterology as discussed  Go immediately to ED if symptoms worsen or new symptoms develop  Will re-evaluate in office next week  BMI Counseling: Body mass index is 25 23 kg/m²  The BMI is above normal  Nutrition recommendations include reducing portion sizes, decreasing overall calorie intake and moderation in carbohydrate intake  Exercise recommendations include exercising 3-5 times per week  Subjective:      Patient ID: Abby Corona is a 47 y o  female who presents for chest discomfort  Chest discomfort  Mid chest to esophagus  Sometimes very painful along "throat/esophagus"  Intermittent for months  Bad yesterday, lasted for about 24 hours  Pressure  Denies associated sob, lightheadness, dizziness, palpitations  History of coronary blockage, 40-60%, in one of the arteries but does not know which artery  Has cardiology, Dr Lilia Leigh  Testing done several years ago  Did schedule appt with cardio and will be seeing today at 4pm   Was supposed to take baby aspirin and chol med but has not been taking  Denies indigestion/heart burn  No n/v  The following portions of the patient's history were reviewed and updated as appropriate: allergies, current medications, past family history, past medical history, past social history, past surgical history and problem list     Review of Systems   Constitutional: Negative for fatigue, fever and unexpected weight change  Respiratory: Positive for chest tightness  Negative for shortness of breath  Cardiovascular: Positive for chest pain  Negative for palpitations and leg swelling  Gastrointestinal: Positive for abdominal pain (upper abdomen pain at times into "esophagus")  Negative for diarrhea, nausea and vomiting  Musculoskeletal: Negative for back pain  Skin: Negative for color change and pallor  Neurological: Negative for dizziness and headaches (does have history of migraines but currently controlled)  Objective:      /70   Pulse 91   Temp 97 7 °F (36 5 °C) (Temporal)   Resp 16   Ht 5' 4" (1 626 m)   Wt 66 7 kg (147 lb)   SpO2 98%   BMI 25 23 kg/m²          Physical Exam   Constitutional: She is oriented to person, place, and time  She appears well-developed and well-nourished  No distress  Cardiovascular: Normal rate and regular rhythm  No JVD  No audible carotid bruit  No peripheral edema  Pulmonary/Chest: Effort normal and breath sounds normal  No stridor  No respiratory distress  Abdominal: Soft   Bowel sounds are normal  She exhibits no distension  There is no tenderness  Musculoskeletal: She exhibits no edema  Neurological: She is alert and oriented to person, place, and time  No cranial nerve deficit  Coordination normal    Skin: Skin is warm and dry  No rash noted  She is not diaphoretic  No erythema  No pallor  Psychiatric: She has a normal mood and affect  Her behavior is normal  Judgment and thought content normal    Vitals reviewed  EKG NSR  No st elevation  No ectopy  Rate 78

## 2019-09-20 LAB
25(OH)D3+25(OH)D2 SERPL-MCNC: 35.7 NG/ML (ref 30–100)
ALBUMIN SERPL-MCNC: 4.7 G/DL (ref 3.5–5.5)
ALBUMIN/GLOB SERPL: 1.6 {RATIO} (ref 1.2–2.2)
ALP SERPL-CCNC: 83 IU/L (ref 39–117)
ALT SERPL-CCNC: 14 IU/L (ref 0–32)
AST SERPL-CCNC: 19 IU/L (ref 0–40)
BASOPHILS # BLD AUTO: 0 X10E3/UL (ref 0–0.2)
BASOPHILS NFR BLD AUTO: 0 %
BILIRUB SERPL-MCNC: 0.3 MG/DL (ref 0–1.2)
BUN SERPL-MCNC: 15 MG/DL (ref 6–24)
BUN/CREAT SERPL: 24 (ref 9–23)
CALCIUM SERPL-MCNC: 10 MG/DL (ref 8.7–10.2)
CHLORIDE SERPL-SCNC: 104 MMOL/L (ref 96–106)
CHOLEST SERPL-MCNC: 200 MG/DL (ref 100–199)
CHOLEST/HDLC SERPL: 2.5 RATIO (ref 0–4.4)
CO2 SERPL-SCNC: 24 MMOL/L (ref 20–29)
CREAT SERPL-MCNC: 0.62 MG/DL (ref 0.57–1)
EOSINOPHIL # BLD AUTO: 0.2 X10E3/UL (ref 0–0.4)
EOSINOPHIL NFR BLD AUTO: 3 %
ERYTHROCYTE [DISTWIDTH] IN BLOOD BY AUTOMATED COUNT: 14.5 % (ref 12.3–15.4)
EST. AVERAGE GLUCOSE BLD GHB EST-MCNC: 108 MG/DL
GLOBULIN SER-MCNC: 3 G/DL (ref 1.5–4.5)
GLUCOSE SERPL-MCNC: 94 MG/DL (ref 65–99)
HBA1C MFR BLD: 5.4 % (ref 4.8–5.6)
HCT VFR BLD AUTO: 36.1 % (ref 34–46.6)
HCV AB S/CO SERPL IA: <0.1 S/CO RATIO (ref 0–0.9)
HDLC SERPL-MCNC: 79 MG/DL
HGB BLD-MCNC: 12.6 G/DL (ref 11.1–15.9)
IMM GRANULOCYTES # BLD: 0 X10E3/UL (ref 0–0.1)
IMM GRANULOCYTES NFR BLD: 0 %
LDLC SERPL CALC-MCNC: 107 MG/DL (ref 0–99)
LYMPHOCYTES # BLD AUTO: 1.7 X10E3/UL (ref 0.7–3.1)
LYMPHOCYTES NFR BLD AUTO: 32 %
MCH RBC QN AUTO: 27.8 PG (ref 26.6–33)
MCHC RBC AUTO-ENTMCNC: 34.9 G/DL (ref 31.5–35.7)
MCV RBC AUTO: 80 FL (ref 79–97)
MONOCYTES # BLD AUTO: 0.3 X10E3/UL (ref 0.1–0.9)
MONOCYTES NFR BLD AUTO: 6 %
NEUTROPHILS # BLD AUTO: 3.1 X10E3/UL (ref 1.4–7)
NEUTROPHILS NFR BLD AUTO: 59 %
PLATELET # BLD AUTO: 254 X10E3/UL (ref 150–450)
POTASSIUM SERPL-SCNC: 4.2 MMOL/L (ref 3.5–5.2)
PROT SERPL-MCNC: 7.7 G/DL (ref 6–8.5)
RBC # BLD AUTO: 4.54 X10E6/UL (ref 3.77–5.28)
SL AMB EGFR AFRICAN AMERICAN: 118 ML/MIN/1.73
SL AMB EGFR NON AFRICAN AMERICAN: 103 ML/MIN/1.73
SL AMB VLDL CHOLESTEROL CALC: 14 MG/DL (ref 5–40)
SODIUM SERPL-SCNC: 141 MMOL/L (ref 134–144)
TRIGL SERPL-MCNC: 71 MG/DL (ref 0–149)
WBC # BLD AUTO: 5.4 X10E3/UL (ref 3.4–10.8)

## 2019-09-25 ENCOUNTER — OFFICE VISIT (OUTPATIENT)
Dept: FAMILY MEDICINE CLINIC | Facility: CLINIC | Age: 54
End: 2019-09-25
Payer: COMMERCIAL

## 2019-09-25 VITALS
TEMPERATURE: 97.4 F | BODY MASS INDEX: 24.92 KG/M2 | HEART RATE: 92 BPM | DIASTOLIC BLOOD PRESSURE: 70 MMHG | HEIGHT: 64 IN | OXYGEN SATURATION: 98 % | SYSTOLIC BLOOD PRESSURE: 110 MMHG | RESPIRATION RATE: 12 BRPM | WEIGHT: 146 LBS

## 2019-09-25 DIAGNOSIS — E78.2 MULTIPLE-TYPE HYPERLIPIDEMIA: ICD-10-CM

## 2019-09-25 DIAGNOSIS — I25.10 CORONARY ARTERY DISEASE INVOLVING NATIVE HEART WITHOUT ANGINA PECTORIS, UNSPECIFIED VESSEL OR LESION TYPE: ICD-10-CM

## 2019-09-25 DIAGNOSIS — R07.89 ATYPICAL CHEST PAIN: Primary | ICD-10-CM

## 2019-09-25 PROBLEM — R73.03 PRE-DIABETES: Status: ACTIVE | Noted: 2019-09-25

## 2019-09-25 PROCEDURE — 3008F BODY MASS INDEX DOCD: CPT | Performed by: NURSE PRACTITIONER

## 2019-09-25 PROCEDURE — 99214 OFFICE O/P EST MOD 30 MIN: CPT | Performed by: NURSE PRACTITIONER

## 2019-09-25 RX ORDER — ASPIRIN 81 MG/1
81 TABLET ORAL DAILY
Qty: 90 TABLET | Refills: 1 | Status: SHIPPED | OUTPATIENT
Start: 2019-09-25 | End: 2020-06-22 | Stop reason: ALTCHOICE

## 2019-09-25 NOTE — PATIENT INSTRUCTIONS
Continue with current medications  No change to current treatment plan  Start taking Aspirin 81 daily as previously ordered  Highly recommend that you start taking daily Crestor as prescribed  Follow up with cardiology as scheduled  Follow up as discussed or return to office earlier if you have any new issues or concerns

## 2019-09-25 NOTE — PROGRESS NOTES
Assessment/Plan:    1  Atypical chest pain  Resolved  Follow up with cardio as scheduled  2  Coronary artery disease involving native heart without angina pectoris, unspecified vessel or lesion type  Pt has not been compliant with meds  Counseled in depth regarding risk factor reduction  Continue with Aspirin and statin as prescribed  Pt agreeable  - aspirin (ECOTRIN LOW STRENGTH) 81 mg EC tablet; Take 1 tablet (81 mg total) by mouth daily  Dispense: 90 tablet; Refill: 1  3  Multiple-type hyperlipidemia  Heart healthy diet  Crestor  Labs reviewed  hgba1c 5 8- counseled in depth regarding pre diabetes , carb controlled diet, increasing regular exercise  Patient was counseled regarding instructions for management which included: impression/diagnosis, risk/benefits of treatment options, importance of compliance with treatment, risk factor reduction, and prognosis  I have reviewed the instructions with the patient answering all questions and patient verbalized understanding  Subjective:      Patient ID: Braden Collier is a 47 y o  female who presents to follow up    Here to follow up on chest pain  Saw cardiology on 9/18/19  Did not start omeprazole that was prescribed at last appt  Will have CT in October  No further chest pain  Feels okay  No sob, dizziness, headaches  Migraines have been well controlled but cannot take imitrex anymore per cardio so will follow up with neuro regarding migraines  The following portions of the patient's history were reviewed and updated as appropriate: allergies, current medications, past family history, past medical history, past social history, past surgical history and problem list     Review of Systems   Constitutional: Negative for activity change, appetite change, fatigue and unexpected weight change  Respiratory: Negative for chest tightness and shortness of breath      Cardiovascular: Negative for chest pain, palpitations and leg swelling  Endocrine: Negative for cold intolerance, heat intolerance, polydipsia, polyphagia and polyuria  Skin: Negative for color change, pallor, rash and wound  Neurological: Negative for dizziness and headaches  Objective:  Cardiology notes from 9/18/19 reviewed  Advised to stop Imitrex  Discussed possibility of a cardiac cath for definitive diagnosis  Pt strongly against cath  Pt agreeable to CTA  Advised to take daily aspirin and statin        Recent Results (from the past 672 hour(s))   CBC and differential    Collection Time: 09/18/19 11:25 AM   Result Value Ref Range    White Blood Cell Count 5 4 3 4 - 10 8 x10E3/uL    Red Blood Cell Count 4 54 3 77 - 5 28 x10E6/uL    Hemoglobin 12 6 11 1 - 15 9 g/dL    HCT 36 1 34 0 - 46 6 %    MCV 80 79 - 97 fL    MCH 27 8 26 6 - 33 0 pg    MCHC 34 9 31 5 - 35 7 g/dL    RDW 14 5 12 3 - 15 4 %    Platelet Count 159 163 - 450 x10E3/uL    Neutrophils 59 Not Estab  %    Lymphocytes 32 Not Estab  %    Monocytes 6 Not Estab  %    Eosinophils 3 Not Estab  %    Basophils PCT 0 Not Estab  %    Neutrophils (Absolute) 3 1 1 4 - 7 0 x10E3/uL    Lymphocytes (Absolute) 1 7 0 7 - 3 1 x10E3/uL    Monocytes (Absolute) 0 3 0 1 - 0 9 x10E3/uL    Eosinophils (Absolute) 0 2 0 0 - 0 4 x10E3/uL    Basophils ABS 0 0 0 0 - 0 2 x10E3/uL    Immature Granulocytes 0 Not Estab  %    Immature Granulocytes (Absolute) 0 0 0 0 - 0 1 x10E3/uL   Comprehensive metabolic panel    Collection Time: 09/18/19 11:25 AM   Result Value Ref Range    Glucose, Random 94 65 - 99 mg/dL    BUN 15 6 - 24 mg/dL    Creatinine 0 62 0 57 - 1 00 mg/dL    eGFR Non African American 103 >59 mL/min/1 73    eGFR  118 >59 mL/min/1 73    SL AMB BUN/CREATININE RATIO 24 (H) 9 - 23    Sodium 141 134 - 144 mmol/L    Potassium 4 2 3 5 - 5 2 mmol/L    Chloride 104 96 - 106 mmol/L    CO2 24 20 - 29 mmol/L    CALCIUM 10 0 8 7 - 10 2 mg/dL    Protein, Total 7 7 6 0 - 8 5 g/dL    Albumin 4 7 3 5 - 5 5 g/dL Globulin, Total 3 0 1 5 - 4 5 g/dL    Albumin/Globulin Ratio 1 6 1 2 - 2 2    TOTAL BILIRUBIN 0 3 0 0 - 1 2 mg/dL    Alk Phos Isoenzymes 83 39 - 117 IU/L    AST 19 0 - 40 IU/L    ALT 14 0 - 32 IU/L   Hemoglobin A1C    Collection Time: 09/18/19 11:25 AM   Result Value Ref Range    Hemoglobin A1C 5 4 4 8 - 5 6 %    Estimated Average Glucose 108 mg/dL   Lipid panel    Collection Time: 09/18/19 11:25 AM   Result Value Ref Range    Cholesterol, Total 200 (H) 100 - 199 mg/dL    Triglycerides 71 0 - 149 mg/dL    HDL 79 >39 mg/dL    VLDL Cholesterol Calculated 14 5 - 40 mg/dL    LDL Direct 107 (H) 0 - 99 mg/dL    T  Chol/HDL Ratio 2 5 0 0 - 4 4 ratio   Vitamin D 25 hydroxy    Collection Time: 09/18/19 11:25 AM   Result Value Ref Range    25-HYDROXY VIT D 35 7 30 0 - 100 0 ng/mL   Hepatitis C antibody    Collection Time: 09/18/19 11:25 AM   Result Value Ref Range    HEP C AB <0 1 0 0 - 0 9 s/co ratio       /70 (BP Location: Right arm, Patient Position: Sitting, Cuff Size: Standard)   Pulse 92   Temp (!) 97 4 °F (36 3 °C) (Temporal)   Resp 12   Ht 5' 4" (1 626 m)   Wt 66 2 kg (146 lb)   SpO2 98%   BMI 25 06 kg/m²          Physical Exam   Constitutional: She is oriented to person, place, and time  She appears well-developed and well-nourished  No distress  Cardiovascular: Normal rate and regular rhythm  No JVD  No audible carotid bruit  No peripheral edema  Pulmonary/Chest: Effort normal and breath sounds normal  No respiratory distress  She has no wheezes  Neurological: She is alert and oriented to person, place, and time  No cranial nerve deficit  Coordination normal    Skin: Skin is warm and dry  No rash noted  She is not diaphoretic  No erythema  No pallor  Psychiatric: She has a normal mood and affect  Her behavior is normal  Judgment and thought content normal    Vitals reviewed

## 2019-11-18 ENCOUNTER — OFFICE VISIT (OUTPATIENT)
Dept: FAMILY MEDICINE CLINIC | Facility: CLINIC | Age: 54
End: 2019-11-18
Payer: COMMERCIAL

## 2019-11-18 VITALS
WEIGHT: 146 LBS | TEMPERATURE: 97.9 F | BODY MASS INDEX: 24.92 KG/M2 | OXYGEN SATURATION: 97 % | SYSTOLIC BLOOD PRESSURE: 110 MMHG | RESPIRATION RATE: 12 BRPM | HEART RATE: 100 BPM | DIASTOLIC BLOOD PRESSURE: 60 MMHG | HEIGHT: 64 IN

## 2019-11-18 DIAGNOSIS — F41.9 ANXIETY: ICD-10-CM

## 2019-11-18 DIAGNOSIS — G47.00 INSOMNIA, UNSPECIFIED TYPE: Primary | ICD-10-CM

## 2019-11-18 DIAGNOSIS — Z91.14 NON COMPLIANCE W MEDICATION REGIMEN: ICD-10-CM

## 2019-11-18 PROBLEM — Z91.148 NON COMPLIANCE W MEDICATION REGIMEN: Status: ACTIVE | Noted: 2019-11-18

## 2019-11-18 PROCEDURE — 1036F TOBACCO NON-USER: CPT | Performed by: NURSE PRACTITIONER

## 2019-11-18 PROCEDURE — 99214 OFFICE O/P EST MOD 30 MIN: CPT | Performed by: NURSE PRACTITIONER

## 2019-11-18 RX ORDER — ZOLPIDEM TARTRATE 5 MG/1
5 TABLET ORAL
Qty: 30 TABLET | Refills: 0 | Status: SHIPPED | OUTPATIENT
Start: 2019-11-18 | End: 2020-06-22 | Stop reason: SINTOL

## 2019-11-18 RX ORDER — HYDROXYZINE HYDROCHLORIDE 25 MG/1
25 TABLET, FILM COATED ORAL EVERY 8 HOURS PRN
Qty: 30 TABLET | Refills: 1 | Status: SHIPPED | OUTPATIENT
Start: 2019-11-18 | End: 2020-12-14 | Stop reason: HOSPADM

## 2019-11-18 NOTE — PATIENT INSTRUCTIONS
Sleep hygiene- start ritual, scheduled bed time, limit caffeine, limit stimulation for one hour prior to bedtime  Ambien 5mg at bedtime as needed for sleep  Atarax 25mg as needed for anxiety/panic attacks  Stress management  Activities to divert attention when possible  Conscious breathing techniques as discussed  Coping mechanisms and strategies vary from person to person so try to utilize strategies that you think may work for you (such as meditation, music, etc  )

## 2019-11-18 NOTE — PROGRESS NOTES
Assessment/Plan:  1  Insomnia, unspecified type  Sleep hygiene- start ritual, scheduled bed time, limit caffeine, limit stimulation for one hour prior to bedtime  Ambien 5mg at bedtime as needed for sleep  - zolpidem (AMBIEN) 5 mg tablet; Take 1 tablet (5 mg total) by mouth daily at bedtime as needed for sleep  Dispense: 30 tablet; Refill: 0  Reviewed   Pt is not receiving controlled substances from other providers  Discussed the risks of developing physical/psychological dependence of the controlled substance  Discussed risks, dangers of addiction overdose  Discussed dangers of concurrent use of pain medications with alcohol, benzos and other depressants  Patient verbalizes understanding of all the same  Patient is advised that he/she must comply with prescribed treatment plans related to current  disorder including counseling, follow-up, use of adjunctive non pharmacologic approaches  If he/she does not comply with prescribed treatment plans or violate controlled substance agreement he may be subject to dismissal from this practice  He/ she verbalizes understanding and agreement of the above  2  Non compliance w medication regimen  Counseled regarding medications and compliance  Pt verbalized understanding  3  Anxiety  - hydrOXYzine HCL (ATARAX) 25 mg tablet; Take 1 tablet (25 mg total) by mouth every 8 (eight) hours as needed for anxiety  Dispense: 30 tablet; Refill: 1  Stress management  Activities to divert attention when possible  Conscious breathing techniques as discussed  Coping mechanisms and strategies vary from person to person so try to utilize strategies that you think may work for you (such as meditation, music, etc  )  Patient was counseled regarding instructions for management which included: impression/diagnosis, risk/benefits of treatment options, importance of compliance with treatment, risk factor reduction, and prognosis     I have reviewed the instructions with the patient answering all questions and patient verbalized understanding  Subjective:      Patient ID: Rimma Lara is a 47 y o  female who presents for insomnia    Here for insomnia and issues with anxiety  10 years  Started after adopting daughter  Wakes up during night and cannot fall back asleep  Has a lot of stress in life  Starting to have panic attacks  Worse when goes to bed  Feeling of doom, heart racing  In past, has taken anxiety and depression meds   Identified that she does not comply with medication regimen  Wakes up at 6-7 am  Goes to bed around 10pm    Seems to wake up at 1am and then not able to get back into solid sleep  No caffeine in evening  Has tried melatonin and didn't feel well in the am    occ takes flexeril for migraines and seems to get more solid sleep  Tried trazadone in past for sleep and could not tolerate  Does not feel depressed  Denies no suicidal ideation or thoughts  More situational and anxiety related to issues with daughter  Was on Wellbutrin in past and tolerated  Does not necessarily want to start daily medication because knows that she has issue with compliance with daily meds  The following portions of the patient's history were reviewed and updated as appropriate: allergies, current medications, past family history, past medical history, past social history, past surgical history and problem list     Review of Systems   Constitutional: Positive for fatigue  Respiratory: Positive for chest tightness (when having anxiety attack  )  Cardiovascular: Positive for palpitations (when having anxiety attacks)  Allergic/Immunologic: Negative for immunocompromised state  Neurological: Positive for headaches (history of migraines)  Negative for dizziness  Psychiatric/Behavioral: Positive for sleep disturbance  Negative for self-injury and suicidal ideas  The patient is nervous/anxious            Objective:      /60 (BP Location: Right arm, Patient Position: Sitting, Cuff Size: Standard)   Pulse 100   Temp 97 9 °F (36 6 °C) (Temporal)   Resp 12   Ht 5' 4" (1 626 m)   Wt 66 2 kg (146 lb)   SpO2 97%   BMI 25 06 kg/m²          Physical Exam   Constitutional: She appears well-developed and well-nourished  No distress  Cardiovascular: Normal rate and regular rhythm  No JVD  No audible carotid bruit  No peripheral edema  Pulmonary/Chest: Effort normal and breath sounds normal    Neurological: She is alert  No cranial nerve deficit  Coordination normal    Skin: Skin is warm and dry  She is not diaphoretic  Psychiatric: She has a normal mood and affect  Her behavior is normal  Judgment and thought content normal    Well groomed  Dressed appropriately for the weather  Calm  Pleasant   Cooperative  Good eye contact  Converses freely and appropriately  Vitals reviewed

## 2020-01-14 ENCOUNTER — TELEPHONE (OUTPATIENT)
Dept: NEUROLOGY | Facility: CLINIC | Age: 55
End: 2020-01-14

## 2020-01-14 DIAGNOSIS — G43.919 INTRACTABLE MIGRAINE WITHOUT STATUS MIGRAINOSUS, UNSPECIFIED MIGRAINE TYPE: Primary | ICD-10-CM

## 2020-01-14 RX ORDER — BUTALBITAL, ACETAMINOPHEN AND CAFFEINE 50; 325; 40 MG/1; MG/1; MG/1
1 TABLET ORAL EVERY 4 HOURS PRN
Qty: 20 TABLET | Refills: 0 | Status: SHIPPED | OUTPATIENT
Start: 2020-01-14 | End: 2020-05-22 | Stop reason: SDUPTHER

## 2020-01-14 RX ORDER — ONDANSETRON 4 MG/1
4 TABLET, ORALLY DISINTEGRATING ORAL EVERY 6 HOURS PRN
Qty: 20 TABLET | Refills: 0 | Status: SHIPPED | OUTPATIENT
Start: 2020-01-14 | End: 2020-05-22 | Stop reason: SDUPTHER

## 2020-01-14 NOTE — TELEPHONE ENCOUNTER
Pt calls in to state that she had a bad migraine recently that took 15 hours to go away  She states she is no longer on the verapamil and the only med she has as an abortive is toradol  The Toradol was no effective for her  She states that she used to take imitrex which was effective in the past but her cardiologist does not want her taking triptans  Pt is asking for a new abortive medication as well as a medication to take for nausea next time she has a migraine  Pt states that she does not get migraines often anymore and this was her first migraine in about 3 months  Pt uses [facility]

## 2020-01-17 NOTE — TELEPHONE ENCOUNTER
Pt called to f/u on fioricet refill  Advised pt that I will call [address] [facility] to call in fioricet  Received message from Charmaine Rosen who states that she already called in fioricet

## 2020-01-17 NOTE — TELEPHONE ENCOUNTER
Butalbital-acetaminophen-caffeine (FIORICET) -40 mg per tablet  Take 1 tab by mouth every 4 hours as needed for headaches  Quantity 20  No refills at this time

## 2020-01-17 NOTE — TELEPHONE ENCOUNTER
Patient left message on med refill line  Patient picked up Cliffton Cranker from [address] [place] [address] Drive, however, fioricet was not at pharmacy bc it was printed instead of sent electronically  Patient requesting fioricet be sent to pharmacy

## 2020-05-07 ENCOUNTER — TELEPHONE (OUTPATIENT)
Dept: FAMILY MEDICINE CLINIC | Facility: CLINIC | Age: 55
End: 2020-05-07

## 2020-05-07 DIAGNOSIS — E55.9 VITAMIN D DEFICIENCY: ICD-10-CM

## 2020-05-07 DIAGNOSIS — R73.01 ELEVATED FASTING GLUCOSE: Primary | ICD-10-CM

## 2020-05-07 DIAGNOSIS — E78.2 MIXED HYPERLIPIDEMIA: ICD-10-CM

## 2020-05-07 DIAGNOSIS — R53.83 FATIGUE, UNSPECIFIED TYPE: ICD-10-CM

## 2020-05-22 ENCOUNTER — TELEPHONE (OUTPATIENT)
Dept: NEUROLOGY | Facility: CLINIC | Age: 55
End: 2020-05-22

## 2020-05-22 DIAGNOSIS — G43.911 INTRACTABLE MIGRAINE WITH STATUS MIGRAINOSUS, UNSPECIFIED MIGRAINE TYPE: ICD-10-CM

## 2020-05-22 DIAGNOSIS — G43.911 INTRACTABLE MIGRAINE WITH STATUS MIGRAINOSUS, UNSPECIFIED MIGRAINE TYPE: Primary | ICD-10-CM

## 2020-05-22 RX ORDER — BUTALBITAL, ACETAMINOPHEN AND CAFFEINE 50; 325; 40 MG/1; MG/1; MG/1
1 TABLET ORAL EVERY 6 HOURS PRN
Qty: 20 TABLET | Refills: 0 | Status: SHIPPED | OUTPATIENT
Start: 2020-05-22 | End: 2020-12-11

## 2020-05-22 RX ORDER — BUTALBITAL, ACETAMINOPHEN AND CAFFEINE 50; 325; 40 MG/1; MG/1; MG/1
1 TABLET ORAL EVERY 6 HOURS PRN
Qty: 20 TABLET | Refills: 0 | Status: SHIPPED | OUTPATIENT
Start: 2020-05-22 | End: 2020-05-22 | Stop reason: SDUPTHER

## 2020-05-22 RX ORDER — ONDANSETRON 4 MG/1
4 TABLET, FILM COATED ORAL EVERY 8 HOURS PRN
Qty: 20 TABLET | Refills: 0 | Status: SHIPPED | OUTPATIENT
Start: 2020-05-22

## 2020-06-19 ENCOUNTER — TELEPHONE (OUTPATIENT)
Dept: FAMILY MEDICINE CLINIC | Facility: CLINIC | Age: 55
End: 2020-06-19

## 2020-06-22 ENCOUNTER — OFFICE VISIT (OUTPATIENT)
Dept: FAMILY MEDICINE CLINIC | Facility: CLINIC | Age: 55
End: 2020-06-22
Payer: COMMERCIAL

## 2020-06-22 ENCOUNTER — TELEPHONE (OUTPATIENT)
Dept: FAMILY MEDICINE CLINIC | Facility: CLINIC | Age: 55
End: 2020-06-22

## 2020-06-22 VITALS
TEMPERATURE: 98.9 F | OXYGEN SATURATION: 95 % | HEART RATE: 94 BPM | BODY MASS INDEX: 25.95 KG/M2 | DIASTOLIC BLOOD PRESSURE: 70 MMHG | SYSTOLIC BLOOD PRESSURE: 126 MMHG | WEIGHT: 152 LBS | HEIGHT: 64 IN

## 2020-06-22 DIAGNOSIS — F41.9 ANXIETY: Primary | ICD-10-CM

## 2020-06-22 PROCEDURE — 3008F BODY MASS INDEX DOCD: CPT | Performed by: NURSE PRACTITIONER

## 2020-06-22 PROCEDURE — 1036F TOBACCO NON-USER: CPT | Performed by: NURSE PRACTITIONER

## 2020-06-22 PROCEDURE — 99214 OFFICE O/P EST MOD 30 MIN: CPT | Performed by: NURSE PRACTITIONER

## 2020-06-22 RX ORDER — ALPRAZOLAM 0.25 MG/1
TABLET ORAL
Qty: 15 TABLET | Refills: 0 | Status: SHIPPED | OUTPATIENT
Start: 2020-06-22 | End: 2021-01-14 | Stop reason: SDUPTHER

## 2020-12-11 ENCOUNTER — TELEPHONE (OUTPATIENT)
Dept: FAMILY MEDICINE CLINIC | Facility: CLINIC | Age: 55
End: 2020-12-11

## 2020-12-11 DIAGNOSIS — G43.911 INTRACTABLE MIGRAINE WITH STATUS MIGRAINOSUS, UNSPECIFIED MIGRAINE TYPE: ICD-10-CM

## 2020-12-11 DIAGNOSIS — G43.019 INTRACTABLE MIGRAINE WITHOUT AURA AND WITHOUT STATUS MIGRAINOSUS: Primary | ICD-10-CM

## 2020-12-11 RX ORDER — CYCLOBENZAPRINE HCL 5 MG
5 TABLET ORAL 2 TIMES DAILY PRN
Qty: 30 TABLET | Refills: 0 | Status: SHIPPED | OUTPATIENT
Start: 2020-12-11 | End: 2021-10-04 | Stop reason: SDUPTHER

## 2020-12-11 RX ORDER — BUTALBITAL, ACETAMINOPHEN AND CAFFEINE 50; 325; 40 MG/1; MG/1; MG/1
TABLET ORAL
Qty: 20 TABLET | Refills: 0 | Status: SHIPPED | OUTPATIENT
Start: 2020-12-11 | End: 2020-12-14 | Stop reason: SDUPTHER

## 2020-12-14 ENCOUNTER — OFFICE VISIT (OUTPATIENT)
Dept: FAMILY MEDICINE CLINIC | Facility: CLINIC | Age: 55
End: 2020-12-14
Payer: COMMERCIAL

## 2020-12-14 VITALS
WEIGHT: 156 LBS | DIASTOLIC BLOOD PRESSURE: 70 MMHG | OXYGEN SATURATION: 97 % | HEIGHT: 64 IN | HEART RATE: 100 BPM | BODY MASS INDEX: 26.63 KG/M2 | TEMPERATURE: 97.2 F | SYSTOLIC BLOOD PRESSURE: 110 MMHG | RESPIRATION RATE: 12 BRPM

## 2020-12-14 DIAGNOSIS — G43.911 INTRACTABLE MIGRAINE WITH STATUS MIGRAINOSUS, UNSPECIFIED MIGRAINE TYPE: ICD-10-CM

## 2020-12-14 DIAGNOSIS — F32.A DEPRESSION, UNSPECIFIED DEPRESSION TYPE: ICD-10-CM

## 2020-12-14 DIAGNOSIS — F41.9 ANXIETY: Primary | ICD-10-CM

## 2020-12-14 PROCEDURE — 1036F TOBACCO NON-USER: CPT | Performed by: NURSE PRACTITIONER

## 2020-12-14 PROCEDURE — 99214 OFFICE O/P EST MOD 30 MIN: CPT | Performed by: NURSE PRACTITIONER

## 2020-12-14 PROCEDURE — 3008F BODY MASS INDEX DOCD: CPT | Performed by: NURSE PRACTITIONER

## 2020-12-14 PROCEDURE — 3725F SCREEN DEPRESSION PERFORMED: CPT | Performed by: NURSE PRACTITIONER

## 2020-12-14 RX ORDER — SERTRALINE HYDROCHLORIDE 25 MG/1
25 TABLET, FILM COATED ORAL DAILY
Qty: 30 TABLET | Refills: 5 | Status: SHIPPED | OUTPATIENT
Start: 2020-12-14 | End: 2021-01-14 | Stop reason: SDUPTHER

## 2020-12-14 RX ORDER — BUTALBITAL, ACETAMINOPHEN AND CAFFEINE 50; 325; 40 MG/1; MG/1; MG/1
1 TABLET ORAL EVERY 6 HOURS PRN
Qty: 30 TABLET | Refills: 0 | Status: SHIPPED | OUTPATIENT
Start: 2020-12-14 | End: 2021-12-06 | Stop reason: SDUPTHER

## 2020-12-22 ENCOUNTER — OFFICE VISIT (OUTPATIENT)
Dept: FAMILY MEDICINE CLINIC | Facility: CLINIC | Age: 55
End: 2020-12-22
Payer: COMMERCIAL

## 2020-12-22 VITALS
RESPIRATION RATE: 16 BRPM | HEIGHT: 64 IN | TEMPERATURE: 97.2 F | BODY MASS INDEX: 26.98 KG/M2 | SYSTOLIC BLOOD PRESSURE: 116 MMHG | OXYGEN SATURATION: 95 % | DIASTOLIC BLOOD PRESSURE: 70 MMHG | WEIGHT: 158 LBS | HEART RATE: 92 BPM

## 2020-12-22 DIAGNOSIS — F41.9 ANXIETY: Primary | ICD-10-CM

## 2020-12-22 DIAGNOSIS — F32.A DEPRESSION, UNSPECIFIED DEPRESSION TYPE: ICD-10-CM

## 2020-12-22 PROCEDURE — 99213 OFFICE O/P EST LOW 20 MIN: CPT | Performed by: NURSE PRACTITIONER

## 2020-12-22 PROCEDURE — 3008F BODY MASS INDEX DOCD: CPT | Performed by: NURSE PRACTITIONER

## 2020-12-22 PROCEDURE — 1036F TOBACCO NON-USER: CPT | Performed by: NURSE PRACTITIONER

## 2021-01-14 ENCOUNTER — OFFICE VISIT (OUTPATIENT)
Dept: FAMILY MEDICINE CLINIC | Facility: CLINIC | Age: 56
End: 2021-01-14
Payer: COMMERCIAL

## 2021-01-14 VITALS
WEIGHT: 155 LBS | BODY MASS INDEX: 26.46 KG/M2 | HEIGHT: 64 IN | HEART RATE: 88 BPM | TEMPERATURE: 97.5 F | RESPIRATION RATE: 12 BRPM | SYSTOLIC BLOOD PRESSURE: 118 MMHG | OXYGEN SATURATION: 97 % | DIASTOLIC BLOOD PRESSURE: 76 MMHG

## 2021-01-14 DIAGNOSIS — I25.10 CORONARY ARTERY DISEASE INVOLVING NATIVE HEART WITHOUT ANGINA PECTORIS, UNSPECIFIED VESSEL OR LESION TYPE: ICD-10-CM

## 2021-01-14 DIAGNOSIS — F32.A DEPRESSION, UNSPECIFIED DEPRESSION TYPE: Primary | ICD-10-CM

## 2021-01-14 DIAGNOSIS — R10.13 DYSPEPSIA: ICD-10-CM

## 2021-01-14 DIAGNOSIS — Z12.31 ENCOUNTER FOR SCREENING MAMMOGRAM FOR MALIGNANT NEOPLASM OF BREAST: ICD-10-CM

## 2021-01-14 DIAGNOSIS — K22.4 ESOPHAGEAL SPASM: ICD-10-CM

## 2021-01-14 DIAGNOSIS — F41.9 ANXIETY: ICD-10-CM

## 2021-01-14 PROCEDURE — 3008F BODY MASS INDEX DOCD: CPT | Performed by: NURSE PRACTITIONER

## 2021-01-14 PROCEDURE — 1036F TOBACCO NON-USER: CPT | Performed by: NURSE PRACTITIONER

## 2021-01-14 PROCEDURE — 99214 OFFICE O/P EST MOD 30 MIN: CPT | Performed by: NURSE PRACTITIONER

## 2021-01-14 PROCEDURE — 3725F SCREEN DEPRESSION PERFORMED: CPT | Performed by: NURSE PRACTITIONER

## 2021-01-14 RX ORDER — OMEPRAZOLE 40 MG/1
40 CAPSULE, DELAYED RELEASE ORAL DAILY PRN
Qty: 30 CAPSULE | Refills: 3 | Status: SHIPPED | OUTPATIENT
Start: 2021-01-14

## 2021-01-14 RX ORDER — ASPIRIN 81 MG/1
81 TABLET ORAL DAILY
Qty: 90 TABLET | Refills: 1 | Status: SHIPPED | OUTPATIENT
Start: 2021-01-14 | End: 2021-07-16 | Stop reason: HOSPADM

## 2021-01-14 RX ORDER — ALPRAZOLAM 0.25 MG/1
TABLET ORAL
Qty: 30 TABLET | Refills: 0 | Status: SHIPPED | OUTPATIENT
Start: 2021-01-14 | End: 2022-06-08 | Stop reason: SDUPTHER

## 2021-01-14 RX ORDER — SERTRALINE HYDROCHLORIDE 25 MG/1
25 TABLET, FILM COATED ORAL DAILY
Qty: 90 TABLET | Refills: 1 | Status: SHIPPED | OUTPATIENT
Start: 2021-01-14 | End: 2021-07-16 | Stop reason: SDUPTHER

## 2021-01-14 NOTE — PROGRESS NOTES
Assessment/Plan:  1  Depression, unspecified depression type  Stable  Continue zoloft  Recommend counseling  Anticipatory guidance  - sertraline (ZOLOFT) 25 mg tablet; Take 1 tablet (25 mg total) by mouth daily  Dispense: 90 tablet; Refill: 1  2  Anxiety  Stress management  Activities to divert attention when possible  Conscious breathing techniques as discussed  Coping mechanisms and strategies vary from person to person so try to utilize strategies that you think may work for you (such as meditation, music, etc  )  Consider counseling as discussed  Anti anxiety/depression medications as prescribed  - sertraline (ZOLOFT) 25 mg tablet; Take 1 tablet (25 mg total) by mouth daily  Dispense: 90 tablet; Refill: 1  - ALPRAZolam (XANAX) 0 25 mg tablet; 1/2 to 1 tablet daily as needed  Dispense: 30 tablet; Refill: 0  Given that the patient reports overall reduced anxiety and improved level functioning without significant side effects, I felt a reasonable to continue the patient on current agent and dosing schedule as needed for anxiety  Continue current treatment plan  Reviewed   Review Appropriate  Patient is not receiving medications from other  Providers  No physical or psychological signs of dependence  Patient compliant with our agreement  3  Encounter for screening mammogram for malignant neoplasm of breast  - Mammo screening bilateral w 3d & cad; Future  4  Coronary artery disease involving native heart without angina pectoris, unspecified vessel or lesion type  Stable  Monitor  Follow up with cardiology   - aspirin (ECOTRIN LOW STRENGTH) 81 mg EC tablet; Take 1 tablet (81 mg total) by mouth daily  Dispense: 90 tablet; Refill: 1  5  Dyspepsia  omeprazole (PriLOSEC) 40 MG capsule; Take 1 capsule (40 mg total) by mouth daily as needed (indigestion)  Dispense: 30 capsule; Refill: 3  6  Esophageal spasm  - omeprazole (PriLOSEC) 40 MG capsule;  Take 1 capsule (40 mg total) by mouth daily as needed (indigestion)  Dispense: 30 capsule; Refill: 3    BMI Counseling: Body mass index is 26 61 kg/m²  The BMI is above normal  Nutrition recommendations include reducing portion sizes, decreasing overall calorie intake, reducing intake of saturated fat and trans fat and reducing intake of cholesterol  Exercise recommendations include exercising 3-5 times per week  Depression Screening Follow-up Plan: Patient's depression screening was positive with a PHQ-2 score of 1  Their PHQ-9 score was 2  Patient assessed for underlying major depression  They have no active suicidal ideations  Brief counseling provided and recommend additional follow-up/re-evaluation next office visit  Continue Zoloft 25mg daily  Recommended counseling  Anticipatory guidance  Subjective:      Patient ID: Anne Marie James is a 54 y o  female who presents for follow up on anxiety/depression    Follow up on anxiety and depression  Started on zoloft 12 5mg daily on 12/14  Dose increased to 25mg daily on 12/20  Feels like she is doing so much better on zoloft  Moods much better controlled  Not as angry  Able to manage situations  No panic attacks  Does have rx for xanax but does not use often   Denies suicidal ideation  Does not want to go to counseling yet  Has intermittent issues with indigestion and spasms of espophagus  Needs refill on omeprazole  Can identify triggers such as tomatoes, but when gets symptoms it is very painful  Willing to start daily baby aspirin as was recommended by cardiology in past        The following portions of the patient's history were reviewed and updated as appropriate: allergies, current medications, past family history, past medical history, past social history, past surgical history and problem list     Review of Systems   Constitutional: Negative for unexpected weight change  Respiratory: Negative for chest tightness and shortness of breath  Cardiovascular: Negative for chest pain and palpitations  Gastrointestinal: Negative for abdominal pain, diarrhea, nausea and vomiting  Neurological: Negative for dizziness and headaches  Psychiatric/Behavioral: Negative for self-injury and suicidal ideas  The patient is nervous/anxious  Objective:      /76 (BP Location: Right arm, Patient Position: Sitting, Cuff Size: Adult)   Pulse 88   Temp 97 5 °F (36 4 °C) (Temporal)   Resp 12   Ht 5' 4" (1 626 m)   Wt 70 3 kg (155 lb)   SpO2 97%   BMI 26 61 kg/m²          Physical Exam  Constitutional:       General: She is not in acute distress  Appearance: Normal appearance  She is not ill-appearing  Cardiovascular:      Rate and Rhythm: Normal rate and regular rhythm  Pulmonary:      Effort: Pulmonary effort is normal    Skin:     General: Skin is warm and dry  Coloration: Skin is not jaundiced or pale  Neurological:      General: No focal deficit present  Mental Status: She is alert and oriented to person, place, and time  Cranial Nerves: No cranial nerve deficit  Sensory: No sensory deficit  Gait: Gait normal    Psychiatric:         Mood and Affect: Mood normal          Behavior: Behavior normal          Thought Content: Thought content normal          Judgment: Judgment normal       Comments: Well groomed  Dressed appropriately for the weather  Calm  Pleasant   Cooperative  Good eye contact  Converses freely and appropriately

## 2021-01-14 NOTE — PATIENT INSTRUCTIONS
Stress management  Activities to divert attention when possible  Conscious breathing techniques as discussed  Coping mechanisms and strategies vary from person to person so try to utilize strategies that you think may work for you (such as meditation, music, etc  )  Consider counseling as discussed  Anti anxiety/depression medications as prescribed  Zoloft 25 mg daily  Xanax as needed     Continue all other medications as prescribed

## 2021-03-09 ENCOUNTER — OFFICE VISIT (OUTPATIENT)
Dept: FAMILY MEDICINE CLINIC | Facility: CLINIC | Age: 56
End: 2021-03-09
Payer: COMMERCIAL

## 2021-03-09 VITALS
HEART RATE: 92 BPM | RESPIRATION RATE: 16 BRPM | HEIGHT: 64 IN | TEMPERATURE: 97.9 F | DIASTOLIC BLOOD PRESSURE: 72 MMHG | OXYGEN SATURATION: 97 % | WEIGHT: 155 LBS | BODY MASS INDEX: 26.46 KG/M2 | SYSTOLIC BLOOD PRESSURE: 116 MMHG

## 2021-03-09 DIAGNOSIS — F41.9 ANXIETY: ICD-10-CM

## 2021-03-09 DIAGNOSIS — R05.8 NOCTURNAL COUGH: Primary | ICD-10-CM

## 2021-03-09 DIAGNOSIS — Z78.0 POST-MENOPAUSAL: ICD-10-CM

## 2021-03-09 DIAGNOSIS — F32.A DEPRESSION, UNSPECIFIED DEPRESSION TYPE: ICD-10-CM

## 2021-03-09 PROCEDURE — 3008F BODY MASS INDEX DOCD: CPT | Performed by: NURSE PRACTITIONER

## 2021-03-09 PROCEDURE — 1036F TOBACCO NON-USER: CPT | Performed by: NURSE PRACTITIONER

## 2021-03-09 PROCEDURE — 99214 OFFICE O/P EST MOD 30 MIN: CPT | Performed by: NURSE PRACTITIONER

## 2021-03-09 NOTE — PROGRESS NOTES
Assessment/Plan:    1  Nocturnal cough  Night time cough may be due to many different conditions  As discussed, can try taking omeprazole daily rather than as needed as may be secondary to GERD  Also do not eat or drink anything for at least 2 hours prior to going to bed or lying down  Can also be due to seasonal alleriges  Can try daily generic Claritin or Zyrtec  If persists or recurs, call office to discuss further  Do not suspect CHF  2  Post-menopausal  - DXA bone density spine hip and pelvis; Future  3  Anxiety  Stress management  Activities to divert attention when possible  Conscious breathing techniques as discussed  Coping mechanisms and strategies vary from person to person so try to utilize strategies that you think may work for you (such as meditation, music, etc  )  Consider or continue counseling as discussed  Anti anxiety/depression medications as prescribed  4  Depression, unspecified depression type  Stable  Doing well on zoloft 25mg daily  Anticipatory guidance  Monitor  Patient was counseled regarding instructions for management which included: impression/diagnosis, risk/benefits of treatment options, importance of compliance with treatment, risk factor reduction, and prognosis  I have reviewed the instructions with the patient answering all questions and patient verbalized understanding  Subjective:      Patient ID: Munira Brown is a 54 y o  female who presents for cough    Here for nighttime cough for a few months  Seems better now  No sob  No chest pain  No weight fluctuations  No leg swelling  History of gerd  Takes omeprazole only as needed  No seasonal allergies  Doing well on zoloft for anxiety  Tolerating   No side effects  No mood swings           The following portions of the patient's history were reviewed and updated as appropriate: allergies, current medications, past family history, past medical history, past social history, past surgical history and problem list     Review of Systems   Constitutional: Negative for fatigue, fever and unexpected weight change  Respiratory: Positive for cough  Negative for chest tightness and shortness of breath  Cardiovascular: Negative for chest pain, palpitations and leg swelling  Gastrointestinal: Negative for abdominal pain, diarrhea, nausea and vomiting  Skin: Negative for color change and pallor  Allergic/Immunologic: Negative for environmental allergies and immunocompromised state  Neurological: Positive for headaches (hx migraines, none recently)  Negative for dizziness and light-headedness  Psychiatric/Behavioral: Negative for dysphoric mood, self-injury and suicidal ideas  The patient is nervous/anxious (much better on zoloft)  Objective:      /72 (BP Location: Right arm, Patient Position: Sitting, Cuff Size: Adult)   Pulse 92   Temp 97 9 °F (36 6 °C) (Temporal)   Resp 16   Ht 5' 4" (1 626 m)   Wt 70 3 kg (155 lb)   SpO2 97%   BMI 26 61 kg/m²          Physical Exam  Vitals signs reviewed  Constitutional:       General: She is not in acute distress  Appearance: She is not ill-appearing  Neck:      Musculoskeletal: Normal range of motion and neck supple  No neck rigidity  Vascular: No carotid bruit  Comments: No JVD  Cardiovascular:      Rate and Rhythm: Normal rate and regular rhythm  Pulmonary:      Effort: Pulmonary effort is normal  No respiratory distress  Breath sounds: Normal breath sounds  No wheezing or rales  Abdominal:      General: Bowel sounds are normal  There is no distension  Palpations: Abdomen is soft  Tenderness: There is no abdominal tenderness  Musculoskeletal:      Right lower leg: No edema  Left lower leg: No edema  Skin:     General: Skin is warm and dry  Coloration: Skin is not jaundiced or pale  Neurological:      General: No focal deficit present        Mental Status: She is alert and oriented to person, place, and time  Psychiatric:         Mood and Affect: Mood normal          Behavior: Behavior normal          Thought Content:  Thought content normal          Judgment: Judgment normal

## 2021-03-09 NOTE — PATIENT INSTRUCTIONS
Night time cough may be due to many different conditions  As discussed, can try taking omeprazole daily rather than as needed as may be secondary to GERD  Also do not eat or drink anything for at least 2 hours prior to going to bed or lying down  Can also be due to seasonal alleriges  Can try daily generic Claritin or Zyrtec  If persists or recurs, call office to discuss further

## 2021-03-23 ENCOUNTER — TELEPHONE (OUTPATIENT)
Dept: FAMILY MEDICINE CLINIC | Facility: CLINIC | Age: 56
End: 2021-03-23

## 2021-03-23 NOTE — TELEPHONE ENCOUNTER
Peter Castillo called asking if we can fax her mammogram referral to [facility]  Attention to Sarkis Crews  Fax # [phone]    Faxed referral over

## 2021-03-24 DIAGNOSIS — Z78.0 POST-MENOPAUSAL: ICD-10-CM

## 2021-03-24 DIAGNOSIS — Z12.31 ENCOUNTER FOR SCREENING MAMMOGRAM FOR MALIGNANT NEOPLASM OF BREAST: ICD-10-CM

## 2021-03-25 ENCOUNTER — TELEPHONE (OUTPATIENT)
Dept: FAMILY MEDICINE CLINIC | Facility: CLINIC | Age: 56
End: 2021-03-25

## 2021-03-25 NOTE — TELEPHONE ENCOUNTER
Please call pt and advise that the strengths that I provided on the prior message are otc and the correct tx for osteopenia because it is not osteoporosis  Over the counter 1000-1200mg calcium is divided doses daily (ie: 600mg bid) and otc vitamin D3 1000 units

## 2021-03-25 NOTE — TELEPHONE ENCOUNTER
Received results of bone density  Since is "severe" she is looking for full strength prescription grade - Calcium 500 mg bid and Vitamin D3 1,000 units QD  Pharmacy:  [facility]    Can you Rx a prescription or does she have to take OTC? She would prefer a script for both if you can do that      Nhan Howell

## 2021-04-25 LAB
25(OH)D3+25(OH)D2 SERPL-MCNC: 28.6 NG/ML (ref 30–100)
ALBUMIN SERPL-MCNC: 4.3 G/DL (ref 3.8–4.9)
ALBUMIN/GLOB SERPL: 1.5 {RATIO} (ref 1.2–2.2)
ALP SERPL-CCNC: 99 IU/L (ref 39–117)
ALT SERPL-CCNC: 28 IU/L (ref 0–32)
AST SERPL-CCNC: 25 IU/L (ref 0–40)
BASOPHILS # BLD AUTO: 0.1 X10E3/UL (ref 0–0.2)
BASOPHILS NFR BLD AUTO: 1 %
BILIRUB SERPL-MCNC: <0.2 MG/DL (ref 0–1.2)
BUN SERPL-MCNC: 25 MG/DL (ref 6–24)
BUN/CREAT SERPL: 36 (ref 9–23)
CALCIUM SERPL-MCNC: 9.9 MG/DL (ref 8.7–10.2)
CHLORIDE SERPL-SCNC: 105 MMOL/L (ref 96–106)
CHOLEST SERPL-MCNC: 216 MG/DL (ref 100–199)
CHOLEST/HDLC SERPL: 2.8 RATIO (ref 0–4.4)
CO2 SERPL-SCNC: 24 MMOL/L (ref 20–29)
CREAT SERPL-MCNC: 0.7 MG/DL (ref 0.57–1)
EOSINOPHIL # BLD AUTO: 0.2 X10E3/UL (ref 0–0.4)
EOSINOPHIL NFR BLD AUTO: 5 %
ERYTHROCYTE [DISTWIDTH] IN BLOOD BY AUTOMATED COUNT: 13.3 % (ref 11.7–15.4)
EST. AVERAGE GLUCOSE BLD GHB EST-MCNC: 111 MG/DL
GLOBULIN SER-MCNC: 2.8 G/DL (ref 1.5–4.5)
GLUCOSE SERPL-MCNC: 93 MG/DL (ref 65–99)
HBA1C MFR BLD: 5.5 % (ref 4.8–5.6)
HCT VFR BLD AUTO: 39.9 % (ref 34–46.6)
HDLC SERPL-MCNC: 76 MG/DL
HGB BLD-MCNC: 13.1 G/DL (ref 11.1–15.9)
IMM GRANULOCYTES # BLD: 0 X10E3/UL (ref 0–0.1)
IMM GRANULOCYTES NFR BLD: 0 %
LDLC SERPL CALC-MCNC: 128 MG/DL (ref 0–99)
LYMPHOCYTES # BLD AUTO: 1.2 X10E3/UL (ref 0.7–3.1)
LYMPHOCYTES NFR BLD AUTO: 25 %
MCH RBC QN AUTO: 26.5 PG (ref 26.6–33)
MCHC RBC AUTO-ENTMCNC: 32.8 G/DL (ref 31.5–35.7)
MCV RBC AUTO: 81 FL (ref 79–97)
MONOCYTES # BLD AUTO: 0.4 X10E3/UL (ref 0.1–0.9)
MONOCYTES NFR BLD AUTO: 8 %
NEUTROPHILS # BLD AUTO: 2.9 X10E3/UL (ref 1.4–7)
NEUTROPHILS NFR BLD AUTO: 61 %
PLATELET # BLD AUTO: 235 X10E3/UL (ref 150–450)
POTASSIUM SERPL-SCNC: 4.2 MMOL/L (ref 3.5–5.2)
PROT SERPL-MCNC: 7.1 G/DL (ref 6–8.5)
RBC # BLD AUTO: 4.95 X10E6/UL (ref 3.77–5.28)
SL AMB EGFR AFRICAN AMERICAN: 112 ML/MIN/1.73
SL AMB EGFR NON AFRICAN AMERICAN: 97 ML/MIN/1.73
SL AMB VLDL CHOLESTEROL CALC: 12 MG/DL (ref 5–40)
SODIUM SERPL-SCNC: 140 MMOL/L (ref 134–144)
TRIGL SERPL-MCNC: 68 MG/DL (ref 0–149)
TSH SERPL DL<=0.005 MIU/L-ACNC: 2.27 UIU/ML (ref 0.45–4.5)
WBC # BLD AUTO: 4.8 X10E3/UL (ref 3.4–10.8)

## 2021-06-28 ENCOUNTER — OFFICE VISIT (OUTPATIENT)
Dept: FAMILY MEDICINE CLINIC | Facility: CLINIC | Age: 56
End: 2021-06-28
Payer: COMMERCIAL

## 2021-06-28 VITALS
BODY MASS INDEX: 26.29 KG/M2 | TEMPERATURE: 97.2 F | WEIGHT: 154 LBS | OXYGEN SATURATION: 96 % | HEART RATE: 92 BPM | DIASTOLIC BLOOD PRESSURE: 64 MMHG | HEIGHT: 64 IN | SYSTOLIC BLOOD PRESSURE: 104 MMHG

## 2021-06-28 DIAGNOSIS — R92.2 DENSE BREAST TISSUE ON MAMMOGRAM: ICD-10-CM

## 2021-06-28 DIAGNOSIS — I25.10 CORONARY ARTERY DISEASE INVOLVING NATIVE HEART WITHOUT ANGINA PECTORIS, UNSPECIFIED VESSEL OR LESION TYPE: ICD-10-CM

## 2021-06-28 DIAGNOSIS — F32.A DEPRESSION, UNSPECIFIED DEPRESSION TYPE: Primary | ICD-10-CM

## 2021-06-28 DIAGNOSIS — E55.9 VITAMIN D DEFICIENCY: ICD-10-CM

## 2021-06-28 DIAGNOSIS — G47.00 INSOMNIA, UNSPECIFIED TYPE: ICD-10-CM

## 2021-06-28 DIAGNOSIS — R53.83 FATIGUE, UNSPECIFIED TYPE: ICD-10-CM

## 2021-06-28 DIAGNOSIS — Z12.11 SCREENING FOR MALIGNANT NEOPLASM OF COLON: ICD-10-CM

## 2021-06-28 DIAGNOSIS — F41.9 ANXIETY: ICD-10-CM

## 2021-06-28 PROCEDURE — 99214 OFFICE O/P EST MOD 30 MIN: CPT | Performed by: NURSE PRACTITIONER

## 2021-06-28 PROCEDURE — 36415 COLL VENOUS BLD VENIPUNCTURE: CPT | Performed by: NURSE PRACTITIONER

## 2021-06-28 PROCEDURE — 1036F TOBACCO NON-USER: CPT | Performed by: NURSE PRACTITIONER

## 2021-06-28 NOTE — PROGRESS NOTES
Assessment/Plan:  1  Depression, unspecified depression type  Stable  Anticipatory guidance  Continue zoloft  2  Insomnia, unspecified type  Sleep hygiene as reviewed  Stop watching television and/or use of computer at least one hour prior to bedtime  Establish set bedtime  Keep bedroom cool, quiet, and dark  There should be no stimulation in your bedroom  Turn off electronics or put into sleep mode  Can try herbal teas (ie: chamomile or sleepytime tea) one hour prior to bedtime  Consider use of white noise machine if you are easily awakened by noise  Consider use of room darkening blinds/shades to minimize light in room  3  Vitamin D deficiency  - Vitamin D 25 hydroxy  4  Coronary artery disease involving native heart without angina pectoris, unspecified vessel or lesion type  Stable  No recent issues  Monitor  5  Anxiety  Stress management  Activities to divert attention when possible  Conscious breathing techniques as discussed  Coping mechanisms and strategies vary from person to person so try to utilize strategies that you think may work for you (such as meditation, music, etc  )  Anti anxiety/depression medications as prescribed  6  Fatigue, unspecified type  May be multifactorial  Will check labs  If normal, advised to monitor symptoms and try to re-establish normal routine  Increase exercise  Stress management  If symptoms persist, may need to consider dose adjustment on zoloft  Pt agreeable to plan  - CBC and differential  - TSH, 3rd generation  7  Dense breast tissue on mammogram  - US BREAST BILATERAL LIMITED (DIAGNOSTIC); Future  8  Screening for malignant neoplasm of colon  - Cologuard; Future      Patient was counseled regarding instructions for management which included: impression/diagnosis, risk/benefits of treatment options, importance of compliance with treatment, risk factor reduction, and prognosis     I have reviewed the instructions with the patient answering all questions and patient verbalized understanding  BMI Counseling: Body mass index is 26 43 kg/m²  The BMI is above normal  Nutrition recommendations include reducing portion sizes and decreasing overall calorie intake  Exercise recommendations include exercising 3-5 times per week  Depression Screening Follow-up Plan: Patient's depression screening was positive with a PHQ-2 score of 1  Their PHQ-9 score was 10  Patient assessed for underlying major depression  They have no active suicidal ideations  Brief counseling provided and recommend additional follow-up/re-evaluation next office visit  Subjective:      Patient ID: Nisha Beebe is a 64 y o  female who presents for follow up    Here for follow up on multiple chronic conditions  History of cad and was prescribed statin for elevated lipids, but does not take it  Sees cardiology  Ongoing issues with anxiety and depression  Is currently taking zoloft 25mg daily  Denies suicidal ideation  Tolerating med with no side effects  Has had a lot of stress this past month and definitely feels her mood is a little down , but it is situational  Not suicidal   Extremely fatigued lately  Coming home from work and has to nap which is very unusual for her  Saw gyn today for pap  Pt with history of dense breast tissue , multiple, needle biopsies both breasts, maternal aunt (+) breast cancer  In past, has had mammo and ultrasound but did not have ultrasound this year (had mammo in March) and would like that done as per the gyn recommendation  MRI guided bx left breast around 5 years ago  mammo did note breast density  No other issues or concerns          The following portions of the patient's history were reviewed and updated as appropriate: allergies, current medications, past family history, past medical history, past social history, past surgical history and problem list     Review of Systems   Constitutional: Positive for fatigue   Negative for unexpected weight change  Respiratory: Negative for shortness of breath  Cardiovascular: Negative for chest pain and palpitations  Gastrointestinal: Negative for abdominal pain  Allergic/Immunologic: Negative for immunocompromised state  Neurological: Positive for headaches (history of migraines)  Psychiatric/Behavioral: Positive for dysphoric mood and sleep disturbance  Negative for self-injury and suicidal ideas  The patient is nervous/anxious  Objective:      /64   Pulse 92   Temp (!) 97 2 °F (36 2 °C) (Temporal)   Ht 5' 4" (1 626 m)   Wt 69 9 kg (154 lb)   SpO2 96%   BMI 26 43 kg/m²          Physical Exam  Vitals reviewed  Constitutional:       General: She is not in acute distress  Appearance: She is not ill-appearing  Neck:      Vascular: No carotid bruit  Cardiovascular:      Rate and Rhythm: Normal rate and regular rhythm  Pulmonary:      Effort: Pulmonary effort is normal  No respiratory distress  Breath sounds: Normal breath sounds  No wheezing, rhonchi or rales  Musculoskeletal:      Cervical back: Normal range of motion and neck supple  Skin:     General: Skin is warm and dry  Coloration: Skin is not jaundiced or pale  Neurological:      General: No focal deficit present  Mental Status: She is alert and oriented to person, place, and time  Cranial Nerves: No cranial nerve deficit  Sensory: No sensory deficit  Psychiatric:         Mood and Affect: Mood normal          Behavior: Behavior normal          Thought Content:  Thought content normal          Judgment: Judgment normal

## 2021-06-28 NOTE — PATIENT INSTRUCTIONS
Heart healthy, carbohydrate controlled diet- limit red meat, limit saturated fat, moderate salt intake, limit junk food, etc    Regular exercise  Stress management  Routine labwork and screenings as ordered  Stress management  Activities to divert attention when possible  Conscious breathing techniques as discussed  Coping mechanisms and strategies vary from person to person so try to utilize strategies that you think may work for you (such as meditation, music, etc  )  Anti anxiety/depression medications as prescribed     Schedule breast ultrasound   Will check labs

## 2021-06-29 LAB
25(OH)D3+25(OH)D2 SERPL-MCNC: 26.4 NG/ML (ref 30–100)
BASOPHILS # BLD AUTO: 0 X10E3/UL (ref 0–0.2)
BASOPHILS NFR BLD AUTO: 1 %
EOSINOPHIL # BLD AUTO: 0.1 X10E3/UL (ref 0–0.4)
EOSINOPHIL NFR BLD AUTO: 2 %
ERYTHROCYTE [DISTWIDTH] IN BLOOD BY AUTOMATED COUNT: 13.3 % (ref 11.7–15.4)
HCT VFR BLD AUTO: 39.6 % (ref 34–46.6)
HGB BLD-MCNC: 13.2 G/DL (ref 11.1–15.9)
IMM GRANULOCYTES # BLD: 0 X10E3/UL (ref 0–0.1)
IMM GRANULOCYTES NFR BLD: 0 %
LYMPHOCYTES # BLD AUTO: 1.5 X10E3/UL (ref 0.7–3.1)
LYMPHOCYTES NFR BLD AUTO: 29 %
MCH RBC QN AUTO: 27 PG (ref 26.6–33)
MCHC RBC AUTO-ENTMCNC: 33.3 G/DL (ref 31.5–35.7)
MCV RBC AUTO: 81 FL (ref 79–97)
MONOCYTES # BLD AUTO: 0.4 X10E3/UL (ref 0.1–0.9)
MONOCYTES NFR BLD AUTO: 8 %
NEUTROPHILS # BLD AUTO: 3.1 X10E3/UL (ref 1.4–7)
NEUTROPHILS NFR BLD AUTO: 60 %
PLATELET # BLD AUTO: 244 X10E3/UL (ref 150–450)
RBC # BLD AUTO: 4.88 X10E6/UL (ref 3.77–5.28)
TSH SERPL DL<=0.005 MIU/L-ACNC: 1.49 UIU/ML (ref 0.45–4.5)
WBC # BLD AUTO: 5.2 X10E3/UL (ref 3.4–10.8)

## 2021-07-16 ENCOUNTER — OFFICE VISIT (OUTPATIENT)
Dept: FAMILY MEDICINE CLINIC | Facility: CLINIC | Age: 56
End: 2021-07-16
Payer: COMMERCIAL

## 2021-07-16 VITALS
TEMPERATURE: 96.1 F | WEIGHT: 156 LBS | SYSTOLIC BLOOD PRESSURE: 114 MMHG | HEIGHT: 64 IN | RESPIRATION RATE: 16 BRPM | DIASTOLIC BLOOD PRESSURE: 70 MMHG | BODY MASS INDEX: 26.63 KG/M2 | HEART RATE: 92 BPM | OXYGEN SATURATION: 97 %

## 2021-07-16 DIAGNOSIS — F32.A DEPRESSION, UNSPECIFIED DEPRESSION TYPE: Primary | ICD-10-CM

## 2021-07-16 DIAGNOSIS — F41.9 ANXIETY: ICD-10-CM

## 2021-07-16 PROCEDURE — 99214 OFFICE O/P EST MOD 30 MIN: CPT | Performed by: NURSE PRACTITIONER

## 2021-07-16 PROCEDURE — 3008F BODY MASS INDEX DOCD: CPT | Performed by: NURSE PRACTITIONER

## 2021-07-16 PROCEDURE — 1036F TOBACCO NON-USER: CPT | Performed by: NURSE PRACTITIONER

## 2021-07-16 RX ORDER — SERTRALINE HYDROCHLORIDE 25 MG/1
37.5 TABLET, FILM COATED ORAL DAILY
Qty: 135 TABLET | Refills: 1 | Status: SHIPPED | OUTPATIENT
Start: 2021-07-16 | End: 2021-10-14 | Stop reason: SDUPTHER

## 2021-07-16 NOTE — PROGRESS NOTES
Assessment/Plan:  1  Depression, unspecified depression type  Will increase zoloft o 37 5mg daily  Stress management  Activities to divert attention when possible  Conscious breathing techniques as discussed  Coping mechanisms and strategies vary from person to person so try to utilize strategies that you think may work for you (such as meditation, music, etc  )  Anti anxiety/depression medications as prescribed  Will re-evaluate in office in 4 weeks  - sertraline (ZOLOFT) 25 mg tablet; Take 1 5 tablets (37 5 mg total) by mouth daily  Dispense: 135 tablet; Refill: 1  2  Anxiety  Will increase zoloft o 37 5mg daily  Stress management  Activities to divert attention when possible  Conscious breathing techniques as discussed  Coping mechanisms and strategies vary from person to person so try to utilize strategies that you think may work for you (such as meditation, music, etc  )  Anti anxiety/depression medications as prescribed  Will re-evaluate in office in 4 weeks  - sertraline (ZOLOFT) 25 mg tablet; Take 1 5 tablets (37 5 mg total) by mouth daily  Dispense: 135 tablet; Refill: 1      Patient was counseled regarding instructions for management which included: impression/diagnosis, risk/benefits of treatment options, importance of compliance with treatment, risk factor reduction, and prognosis  I have reviewed the instructions with the patient answering all questions and patient verbalized understanding  Depression Screening Follow-up Plan: Patient's depression screening was positive with a PHQ-2 score of 4  Their PHQ-9 score was 11  Patient assessed for underlying major depression  They have no active suicidal ideations  Brief counseling provided and recommend additional follow-up/re-evaluation next office visit  Subjective:      Patient ID: July Mcelroy is a 64 y o  female who presents for med check    Here to discuss zoloft  Anxiety seems a little better  No panic attacks   But "very angry" all the time  No energy  Fatigued  Mood swings  Denies suicidal ideation  Does not want to go out of the house  No motivation to do anything  Has been taking xanax sometimes at night to sleep and occ during the day if feeling very stressed  Tolerating meds with no side effects  The following portions of the patient's history were reviewed and updated as appropriate: allergies, current medications, past family history, past medical history, past social history, past surgical history and problem list     Review of Systems   Constitutional: Positive for fatigue  Respiratory: Negative for shortness of breath  Cardiovascular: Negative for palpitations  Gastrointestinal: Negative for abdominal pain  Psychiatric/Behavioral: Positive for dysphoric mood and sleep disturbance  Negative for self-injury and suicidal ideas  The patient is nervous/anxious  Objective:      /70 (BP Location: Right arm, Patient Position: Sitting, Cuff Size: Adult)   Pulse 92   Temp (!) 96 1 °F (35 6 °C) (Temporal)   Resp 16   Ht 5' 4" (1 626 m)   Wt 70 8 kg (156 lb)   SpO2 97%   BMI 26 78 kg/m²          Physical Exam  Vitals reviewed  Constitutional:       General: She is not in acute distress  Appearance: Normal appearance  She is not ill-appearing  Cardiovascular:      Rate and Rhythm: Normal rate and regular rhythm  Pulmonary:      Effort: Pulmonary effort is normal  No respiratory distress  Breath sounds: Normal breath sounds  No wheezing  Skin:     General: Skin is warm and dry  Coloration: Skin is not jaundiced or pale  Neurological:      General: No focal deficit present  Mental Status: She is alert and oriented to person, place, and time  Cranial Nerves: No cranial nerve deficit  Sensory: No sensory deficit  Psychiatric:         Mood and Affect: Mood normal          Behavior: Behavior normal          Thought Content:  Thought content normal  Judgment: Judgment normal       Comments: Well groomed  Dressed appropriately for the weather  Calm  Pleasant   Cooperative  Good eye contact  Converses freely and appropriately

## 2021-07-16 NOTE — PATIENT INSTRUCTIONS
Increase Zoloft 37 5mg (1 5 tablet of 25mg)  Stress management  Activities to divert attention when possible  Conscious breathing techniques as discussed  Coping mechanisms and strategies vary from person to person so try to utilize strategies that you think may work for you (such as meditation, music, etc  )  Anti anxiety/depression medications as prescribed

## 2021-08-10 ENCOUNTER — OFFICE VISIT (OUTPATIENT)
Dept: GASTROENTEROLOGY | Facility: CLINIC | Age: 56
End: 2021-08-10
Payer: COMMERCIAL

## 2021-08-10 VITALS
HEIGHT: 64 IN | TEMPERATURE: 95.5 F | WEIGHT: 157 LBS | SYSTOLIC BLOOD PRESSURE: 116 MMHG | BODY MASS INDEX: 26.8 KG/M2 | HEART RATE: 80 BPM | DIASTOLIC BLOOD PRESSURE: 77 MMHG

## 2021-08-10 DIAGNOSIS — R10.32 LLQ ABDOMINAL PAIN: ICD-10-CM

## 2021-08-10 DIAGNOSIS — K57.92 DIVERTICULITIS: Primary | ICD-10-CM

## 2021-08-10 PROCEDURE — 3008F BODY MASS INDEX DOCD: CPT | Performed by: NURSE PRACTITIONER

## 2021-08-10 PROCEDURE — 99204 OFFICE O/P NEW MOD 45 MIN: CPT | Performed by: INTERNAL MEDICINE

## 2021-08-10 RX ORDER — MISOPROSTOL 200 UG/1
TABLET ORAL AS NEEDED
COMMUNITY
Start: 2021-08-06 | End: 2021-12-28 | Stop reason: ALTCHOICE

## 2021-08-10 NOTE — PROGRESS NOTES
Consultation - [facility]  Mor Allen 64 y o  female MRN: [id number]  Unit/Bed#:  Encounter: [id number]        Consults    ASSESSMENT/PLAN:       1  Left lower quadrant abdominal discomfort With change in bowel habits with constipation- differential includes resolving diverticulitis versus IBS C versus less likely malignancy given previous colonoscopy 6 years ago  - would recommend increasing fiber at this time  Would recommend starting on Metamucil 1 tbsp on daily basis  Increase water intake to 64 oz daily  Discussed with patient that if she has a worsening left lower quadrant abdominal pain associated fever, she should go to the emergency room  - Will obtain CT of abdomen and pelvis has no prior abdominal imaging   - Patient was explained about  the risks and benefits of the procedure  Risks including but not limited to bleeding, infection, perforation were explained in detail  Also explained about less than 100% sensitivity with the exam and other alternatives  2   Esophageal spasms-  Improved with omeprazole 40 mg  Would recommend EGD at this time to assess for chronic changes of Arroyo's as symptoms have been chronic  Would recommend avoiding acidic foods  Avoid NSAIDs         ______________________________________________________________________    Reason for Consult / Principal Problem: [unfilled]    HPI: Mor Allen is a 64y o  year old female  With history of esophageal spasms, on omeprazole 40 mg which she takes intermittently, presents for evaluation of left lower quadrant abdominal pain and change in bowel habits  Patient reports that she was seen by her OBGYN 1 month ago and was diagnosed with diverticulitis  She was prescribed antibiotics  Patient reports that pain did improve with antibiotics however she still feels some mild residual discomfort    She also reports some change in bowel habits, reports that she has had feeling of incomplete evacuation for almost 1 year  She does report having had a colonoscopy at age of 48, does not recall the results  No family history of colon cancer  Patient also reports having symptoms of esophageal spasms which are often triggered with certain foods, managed with omeprazole 40 mg  She reports that she has never had an EGD however  Patient is a nonsmoker and drinks socially only  No dysphagia, odynophagia, loss of appetite or early satiety  No hematemesis, melena or hematochezia  Labs are reviewed and are notable for normal liver enzymes, normal kidney function, hemoglobin of 13 2, normal platelets at 405  TSH is also normal 1 4  Review of Systems: The remainder of the review of systems was negative except for the pertinent positives noted in HPI  Historical Information   Past Medical History:   Diagnosis Date    CAD (coronary artery disease)     Migraine      Past Surgical History:   Procedure Laterality Date    BREAST SURGERY Right     lumpectomy - benign    COLONOSCOPY      ECTOPIC PREGNANCY SURGERY      KNEE SURGERY Right      Social History   Social History     Substance and Sexual Activity   Alcohol Use Yes    Comment: socially     Social History     Substance and Sexual Activity   Drug Use No     Social History     Tobacco Use   Smoking Status Never Smoker   Smokeless Tobacco Never Used     Family History   Problem Relation Age of Onset    Substance Abuse Neg Hx     Mental illness Neg Hx        Meds/Allergies     (Not in a hospital admission)    No current facility-administered medications for this visit  Allergies   Allergen Reactions    Penicillins Hives       Objective     Blood pressure 116/77, pulse 80, temperature (!) 95 5 °F (35 3 °C), temperature source Temporal, height 5' 4" (1 626 m), weight 71 2 kg (157 lb)      [unfilled]    PHYSICAL EXAM     GEN: well nourished, well developed, no acute distress  HEENT: anicteric, MMM, no cervical or supraclavicular lymphadenopathy  CV: RRR, no m/r/g  CHEST: CTA b/l, no WRR  ABD: +BS, soft, NT/ND, no hepatosplenomegaly  EXT: no c/c/e  SKIN: no rashes,  NEURO: aaox3    Lab Results:   No visits with results within 1 Day(s) from this visit     Latest known visit with results is:   Office Visit on 06/28/2021   Component Date Value    White Blood Cell Count 06/28/2021 5 2     Red Blood Cell Count 06/28/2021 4 88     Hemoglobin 06/28/2021 13 2     HCT 06/28/2021 39 6     MCV 06/28/2021 81     MCH 06/28/2021 27 0     MCHC 06/28/2021 33 3     RDW 06/28/2021 13 3     Platelet Count 95/40/0508 244     Neutrophils 06/28/2021 60     Lymphocytes 06/28/2021 29     Monocytes 06/28/2021 8     Eosinophils 06/28/2021 2     Basophils PCT 06/28/2021 1     Neutrophils (Absolute) 06/28/2021 3 1     Lymphocytes (Absolute) 06/28/2021 1 5     Monocytes (Absolute) 06/28/2021 0 4     Eosinophils (Absolute) 06/28/2021 0 1     Basophils ABS 06/28/2021 0 0     Immature Granulocytes 06/28/2021 0     Immature Granulocytes (A* 06/28/2021 0 0     25-HYDROXY VIT D 06/28/2021 26 4*    TSH 06/28/2021 1 490      Imaging Studies: I have personally reviewed pertinent films in PACS

## 2021-08-10 NOTE — LETTER
August 10, 2021     Janine Mckinney [id number] [address]    Patient: Didi Langley   YOB: 1965   Date of Visit: 8/10/2021       Dear Dr Debrah Dubin: Thank you for referring Didi Langley to me for evaluation  Below are my notes for this consultation  If you have questions, please do not hesitate to call me  I look forward to following your patient along with you  Sincerely,        Marina Larry MD        CC: No Recipients  Marina Larry MD  8/10/2021 10:37 AM  Sign when Signing Visit  Consultation - [facility]  Didi Langley 64 y o  female MRN: [id number]  Unit/Bed#:  Encounter: [id number]        Consults    ASSESSMENT/PLAN:       1  Left lower quadrant abdominal discomfort With change in bowel habits with constipation- differential includes resolving diverticulitis versus IBS C versus less likely malignancy given previous colonoscopy 6 years ago  - would recommend increasing fiber at this time  Would recommend starting on Metamucil 1 tbsp on daily basis  Increase water intake to 64 oz daily  Discussed with patient that if she has a worsening left lower quadrant abdominal pain associated fever, she should go to the emergency room  - Will obtain CT of abdomen and pelvis has no prior abdominal imaging   - Patient was explained about  the risks and benefits of the procedure  Risks including but not limited to bleeding, infection, perforation were explained in detail  Also explained about less than 100% sensitivity with the exam and other alternatives  2   Esophageal spasms-  Improved with omeprazole 40 mg  Would recommend EGD at this time to assess for chronic changes of Arroyo's as symptoms have been chronic  Would recommend avoiding acidic foods      Avoid NSAIDs         ______________________________________________________________________    Reason for Consult / Principal Problem: [unfilled]    HPI: Annika Espinoza is a 64y o  year old female  With history of esophageal spasms, on omeprazole 40 mg which she takes intermittently, presents for evaluation of left lower quadrant abdominal pain and change in bowel habits  Patient reports that she was seen by her OBGYN 1 month ago and was diagnosed with diverticulitis  She was prescribed antibiotics  Patient reports that pain did improve with antibiotics however she still feels some mild residual discomfort  She also reports some change in bowel habits, reports that she has had feeling of incomplete evacuation for almost 1 year  She does report having had a colonoscopy at age of 48, does not recall the results  No family history of colon cancer  Patient also reports having symptoms of esophageal spasms which are often triggered with certain foods, managed with omeprazole 40 mg  She reports that she has never had an EGD however  Patient is a nonsmoker and drinks socially only  No dysphagia, odynophagia, loss of appetite or early satiety  No hematemesis, melena or hematochezia  Labs are reviewed and are notable for normal liver enzymes, normal kidney function, hemoglobin of 13 2, normal platelets at 810  TSH is also normal 1 4  Review of Systems: The remainder of the review of systems was negative except for the pertinent positives noted in HPI       Historical Information   Past Medical History:   Diagnosis Date    CAD (coronary artery disease)     Migraine      Past Surgical History:   Procedure Laterality Date    BREAST SURGERY Right     lumpectomy - benign    COLONOSCOPY      ECTOPIC PREGNANCY SURGERY      KNEE SURGERY Right      Social History   Social History     Substance and Sexual Activity   Alcohol Use Yes    Comment: socially     Social History     Substance and Sexual Activity   Drug Use No     Social History     Tobacco Use   Smoking Status Never Smoker   Smokeless Tobacco Never Used     Family History   Problem Relation Age of Onset    Substance Abuse Neg Hx     Mental illness Neg Hx        Meds/Allergies     (Not in a hospital admission)    No current facility-administered medications for this visit  Allergies   Allergen Reactions    Penicillins Hives       Objective     Blood pressure 116/77, pulse 80, temperature (!) 95 5 °F (35 3 °C), temperature source Temporal, height 5' 4" (1 626 m), weight 71 2 kg (157 lb)  [unfilled]    PHYSICAL EXAM     GEN: well nourished, well developed, no acute distress  HEENT: anicteric, MMM, no cervical or supraclavicular lymphadenopathy  CV: RRR, no m/r/g  CHEST: CTA b/l, no WRR  ABD: +BS, soft, NT/ND, no hepatosplenomegaly  EXT: no c/c/e  SKIN: no rashes,  NEURO: aaox3    Lab Results:   No visits with results within 1 Day(s) from this visit     Latest known visit with results is:   Office Visit on 06/28/2021   Component Date Value    White Blood Cell Count 06/28/2021 5 2     Red Blood Cell Count 06/28/2021 4 88     Hemoglobin 06/28/2021 13 2     HCT 06/28/2021 39 6     MCV 06/28/2021 81     MCH 06/28/2021 27 0     MCHC 06/28/2021 33 3     RDW 06/28/2021 13 3     Platelet Count 72/12/9038 244     Neutrophils 06/28/2021 60     Lymphocytes 06/28/2021 29     Monocytes 06/28/2021 8     Eosinophils 06/28/2021 2     Basophils PCT 06/28/2021 1     Neutrophils (Absolute) 06/28/2021 3 1     Lymphocytes (Absolute) 06/28/2021 1 5     Monocytes (Absolute) 06/28/2021 0 4     Eosinophils (Absolute) 06/28/2021 0 1     Basophils ABS 06/28/2021 0 0     Immature Granulocytes 06/28/2021 0     Immature Granulocytes (A* 06/28/2021 0 0     25-HYDROXY VIT D 06/28/2021 26 4*    TSH 06/28/2021 1 490      Imaging Studies: I have personally reviewed pertinent films in PACS

## 2021-08-17 ENCOUNTER — HOSPITAL ENCOUNTER (OUTPATIENT)
Dept: RADIOLOGY | Facility: HOSPITAL | Age: 56
Discharge: HOME | End: 2021-08-17
Attending: INTERNAL MEDICINE
Payer: COMMERCIAL

## 2021-08-17 DIAGNOSIS — R10.32 LLQ ABDOMINAL PAIN: ICD-10-CM

## 2021-08-17 PROCEDURE — 74177 CT ABD & PELVIS W/CONTRAST: CPT

## 2021-08-17 RX ADMIN — IOHEXOL 100 ML: 350 INJECTION, SOLUTION INTRAVENOUS at 10:15

## 2021-08-20 ENCOUNTER — TELEPHONE (OUTPATIENT)
Dept: GASTROENTEROLOGY | Facility: AMBULARY SURGERY CENTER | Age: 56
End: 2021-08-20

## 2021-08-20 NOTE — TELEPHONE ENCOUNTER
Spoke to pt, advised her results were not in yet from radiology that it can take a week or two  Pt states how long the results take will dictate if she will see another practice  Advised the office will call her as soon as the results come in and to call with any questions or concerns

## 2021-08-20 NOTE — TELEPHONE ENCOUNTER
Patients GI provider:  Dr Charity Suresh     Number to return call: [phone]    Reason for call: Pt calling requesting for her CT results       Scheduled procedure/appointment date if applicable: Apt/procedure 9/24-21

## 2021-09-20 ENCOUNTER — TELEPHONE (OUTPATIENT)
Dept: GASTROENTEROLOGY | Facility: CLINIC | Age: 56
End: 2021-09-20

## 2021-09-20 NOTE — TELEPHONE ENCOUNTER
Patients GI provider:  Dr Nico Cochran    Number to return call: [phone]    Reason for call: Pt called wanting to know the specific time of procedure  Pt stated has to notified her ride of the time  Pt stated if specific time is provided she will have to cancel procedure and find another facility        Scheduled procedure/appointment date if applicable: Procedure  5/18/33

## 2021-09-23 ENCOUNTER — TELEPHONE (OUTPATIENT)
Dept: GASTROENTEROLOGY | Facility: AMBULARY SURGERY CENTER | Age: 56
End: 2021-09-23

## 2021-09-23 VITALS — HEIGHT: 64 IN | BODY MASS INDEX: 26.8 KG/M2 | WEIGHT: 157 LBS

## 2021-09-24 ENCOUNTER — HOSPITAL ENCOUNTER (OUTPATIENT)
Dept: GASTROENTEROLOGY | Facility: AMBULARY SURGERY CENTER | Age: 56
Setting detail: OUTPATIENT SURGERY
Discharge: HOME | End: 2021-09-24
Attending: INTERNAL MEDICINE
Payer: COMMERCIAL

## 2021-09-24 ENCOUNTER — ANESTHESIA EVENT (OUTPATIENT)
Dept: GASTROENTEROLOGY | Facility: AMBULARY SURGERY CENTER | Age: 56
End: 2021-09-24

## 2021-09-24 ENCOUNTER — ANESTHESIA (OUTPATIENT)
Dept: GASTROENTEROLOGY | Facility: AMBULARY SURGERY CENTER | Age: 56
End: 2021-09-24

## 2021-09-24 VITALS
SYSTOLIC BLOOD PRESSURE: 133 MMHG | TEMPERATURE: 96.3 F | HEIGHT: 64 IN | WEIGHT: 155 LBS | RESPIRATION RATE: 18 BRPM | DIASTOLIC BLOOD PRESSURE: 58 MMHG | HEART RATE: 86 BPM | BODY MASS INDEX: 26.46 KG/M2 | OXYGEN SATURATION: 99 %

## 2021-09-24 DIAGNOSIS — K57.92 DIVERTICULITIS: ICD-10-CM

## 2021-09-24 DIAGNOSIS — R10.32 LLQ ABDOMINAL PAIN: ICD-10-CM

## 2021-09-24 PROCEDURE — 43239 EGD BIOPSY SINGLE/MULTIPLE: CPT | Performed by: INTERNAL MEDICINE

## 2021-09-24 PROCEDURE — 45378 DIAGNOSTIC COLONOSCOPY: CPT | Performed by: INTERNAL MEDICINE

## 2021-09-24 PROCEDURE — 88305 TISSUE EXAM BY PATHOLOGIST: CPT | Performed by: PATHOLOGY

## 2021-09-24 RX ORDER — SODIUM CHLORIDE, SODIUM LACTATE, POTASSIUM CHLORIDE, CALCIUM CHLORIDE 600; 310; 30; 20 MG/100ML; MG/100ML; MG/100ML; MG/100ML
125 INJECTION, SOLUTION INTRAVENOUS CONTINUOUS
Status: DISCONTINUED | OUTPATIENT
Start: 2021-09-24 | End: 2021-09-28 | Stop reason: HOSPADM

## 2021-09-24 RX ORDER — SODIUM CHLORIDE, SODIUM LACTATE, POTASSIUM CHLORIDE, CALCIUM CHLORIDE 600; 310; 30; 20 MG/100ML; MG/100ML; MG/100ML; MG/100ML
INJECTION, SOLUTION INTRAVENOUS CONTINUOUS PRN
Status: DISCONTINUED | OUTPATIENT
Start: 2021-09-24 | End: 2021-09-24

## 2021-09-24 RX ORDER — LIDOCAINE HYDROCHLORIDE 10 MG/ML
INJECTION, SOLUTION EPIDURAL; INFILTRATION; INTRACAUDAL; PERINEURAL AS NEEDED
Status: DISCONTINUED | OUTPATIENT
Start: 2021-09-24 | End: 2021-09-24

## 2021-09-24 RX ORDER — SODIUM CHLORIDE, SODIUM LACTATE, POTASSIUM CHLORIDE, CALCIUM CHLORIDE 600; 310; 30; 20 MG/100ML; MG/100ML; MG/100ML; MG/100ML
125 INJECTION, SOLUTION INTRAVENOUS CONTINUOUS
Status: CANCELLED | OUTPATIENT
Start: 2021-09-24

## 2021-09-24 RX ORDER — PROPOFOL 10 MG/ML
INJECTION, EMULSION INTRAVENOUS CONTINUOUS PRN
Status: DISCONTINUED | OUTPATIENT
Start: 2021-09-24 | End: 2021-09-24

## 2021-09-24 RX ADMIN — SODIUM CHLORIDE, SODIUM LACTATE, POTASSIUM CHLORIDE, AND CALCIUM CHLORIDE: .6; .31; .03; .02 INJECTION, SOLUTION INTRAVENOUS at 11:04

## 2021-09-24 RX ADMIN — LIDOCAINE HYDROCHLORIDE 50 MG: 10 INJECTION, SOLUTION EPIDURAL; INFILTRATION; INTRACAUDAL; PERINEURAL at 10:52

## 2021-09-24 RX ADMIN — PROPOFOL 120 MCG/KG/MIN: 10 INJECTION, EMULSION INTRAVENOUS at 10:52

## 2021-09-24 RX ADMIN — SODIUM CHLORIDE, SODIUM LACTATE, POTASSIUM CHLORIDE, AND CALCIUM CHLORIDE: .6; .31; .03; .02 INJECTION, SOLUTION INTRAVENOUS at 10:54

## 2021-09-24 NOTE — ANESTHESIA PREPROCEDURE EVALUATION
Procedure:  COLONOSCOPY  EGD    Relevant Problems   CARDIO   (+) CAD (coronary artery disease)   (+) Multiple-type hyperlipidemia      NEURO/PSYCH   (+) Anxiety   (+) Depression        Physical Exam    Airway    Mallampati score: II  TM Distance: >3 FB  Neck ROM: full     Dental   No notable dental hx     Cardiovascular      Pulmonary      Other Findings        Anesthesia Plan  ASA Score- 2     Anesthesia Type- IV sedation with anesthesia with ASA Monitors  Additional Monitors:   Airway Plan:           Plan Factors-Exercise tolerance (METS): >4 METS  Chart reviewed  Existing labs reviewed  Patient summary reviewed  Patient is not a current smoker  Induction- intravenous  Postoperative Plan- Plan for postoperative opioid use  Informed Consent- Anesthetic plan and risks discussed with patient  I personally reviewed this patient with the CRNA  Discussed and agreed on the Anesthesia Plan with the CRNA  Lori Madison

## 2021-09-24 NOTE — ANESTHESIA POSTPROCEDURE EVALUATION
Post-Op Assessment Note    CV Status:  Stable  Pain Score: 1    Pain management: adequate     Mental Status:  Awake   Hydration Status:  Stable   PONV Controlled:  None   Airway Patency:  Patent      Post Op Vitals Reviewed: Yes      Staff: Anesthesiologist         No complications documented      BP      Temp     Pulse     Resp      SpO2

## 2021-09-24 NOTE — H&P
History and Physical -  [facility]  Brooklynn Avalos 64 y o  female MRN: [id number]    HPI: Brooklynn Avalos is a 64y o  year old female who presents evaluation of diverticulitis episode and esophageal spasm  Review of Systems    Historical Information   Past Medical History:   Diagnosis Date    Abdominal pain     LLQ    Anxiety     CAD (coronary artery disease)     slight blockage    Contact lens/glasses fitting     Diverticulosis     Migraine      Past Surgical History:   Procedure Laterality Date    BREAST SURGERY Right     lumpectomy - benign    COLONOSCOPY      ECTOPIC PREGNANCY SURGERY      KNEE SURGERY Right      Social History   Social History     Substance and Sexual Activity   Alcohol Use Yes    Comment: socially     Social History     Substance and Sexual Activity   Drug Use No     Social History     Tobacco Use   Smoking Status Never Smoker   Smokeless Tobacco Never Used     Family History   Problem Relation Age of Onset    No Known Problems Mother     Heart disease Father     Substance Abuse Neg Hx     Mental illness Neg Hx        Meds/Allergies     (Not in a hospital admission)      Allergies   Allergen Reactions    Penicillins Hives       Objective     /70   Pulse 104   Temp (!) 96 3 °F (35 7 °C) (Temporal)   Resp 18   Ht 5' 4" (1 626 m)   Wt 70 3 kg (155 lb)   SpO2 98%   BMI 26 61 kg/m²       PHYSICAL EXAM    Gen: NAD  CV: RRR  CHEST: Clear  ABD: soft, NT/ND  EXT: no edema  Neuro: AAO      ASSESSMENT/PLAN:  This is a 64y o  year old female here for evaluation of history of esophageal spasms and history of diverticulitis  CT scan recently did not show any diverticulitis but did show diverticulosis  PLAN:   Procedure:  EGD and colonoscopy

## 2021-10-04 ENCOUNTER — TELEPHONE (OUTPATIENT)
Dept: NEUROLOGY | Facility: CLINIC | Age: 56
End: 2021-10-04

## 2021-10-04 DIAGNOSIS — G43.019 INTRACTABLE MIGRAINE WITHOUT AURA AND WITHOUT STATUS MIGRAINOSUS: ICD-10-CM

## 2021-10-05 RX ORDER — CYCLOBENZAPRINE HCL 5 MG
5 TABLET ORAL 2 TIMES DAILY PRN
Qty: 30 TABLET | Refills: 0 | Status: SHIPPED | OUTPATIENT
Start: 2021-10-05

## 2021-10-06 ENCOUNTER — OFFICE VISIT (OUTPATIENT)
Dept: NEUROLOGY | Facility: CLINIC | Age: 56
End: 2021-10-06
Payer: COMMERCIAL

## 2021-10-06 VITALS
HEIGHT: 64 IN | DIASTOLIC BLOOD PRESSURE: 82 MMHG | WEIGHT: 155 LBS | BODY MASS INDEX: 26.46 KG/M2 | HEART RATE: 87 BPM | SYSTOLIC BLOOD PRESSURE: 120 MMHG | TEMPERATURE: 96.9 F

## 2021-10-06 DIAGNOSIS — G43.119 INTRACTABLE MIGRAINE WITH AURA WITHOUT STATUS MIGRAINOSUS: Primary | ICD-10-CM

## 2021-10-06 DIAGNOSIS — G44.86 CERVICOGENIC HEADACHE: ICD-10-CM

## 2021-10-06 PROCEDURE — 1036F TOBACCO NON-USER: CPT | Performed by: PSYCHIATRY & NEUROLOGY

## 2021-10-06 PROCEDURE — 99215 OFFICE O/P EST HI 40 MIN: CPT | Performed by: PSYCHIATRY & NEUROLOGY

## 2021-10-06 PROCEDURE — 3008F BODY MASS INDEX DOCD: CPT | Performed by: PSYCHIATRY & NEUROLOGY

## 2021-10-06 RX ORDER — ERENUMAB-AOOE 140 MG/ML
140 INJECTION, SOLUTION SUBCUTANEOUS
Qty: 1 ML | Refills: 2 | Status: SHIPPED | OUTPATIENT
Start: 2021-10-06 | End: 2021-12-27

## 2021-10-08 ENCOUNTER — TELEPHONE (OUTPATIENT)
Dept: GASTROENTEROLOGY | Facility: CLINIC | Age: 56
End: 2021-10-08

## 2021-10-08 ENCOUNTER — TELEPHONE (OUTPATIENT)
Dept: GASTROENTEROLOGY | Facility: AMBULARY SURGERY CENTER | Age: 56
End: 2021-10-08

## 2021-10-14 DIAGNOSIS — F41.9 ANXIETY: ICD-10-CM

## 2021-10-14 DIAGNOSIS — F32.A DEPRESSION, UNSPECIFIED DEPRESSION TYPE: ICD-10-CM

## 2021-10-14 RX ORDER — SERTRALINE HYDROCHLORIDE 25 MG/1
37.5 TABLET, FILM COATED ORAL DAILY
Qty: 135 TABLET | Refills: 1 | Status: SHIPPED | OUTPATIENT
Start: 2021-10-14

## 2021-11-23 ENCOUNTER — TELEPHONE (OUTPATIENT)
Dept: NEUROLOGY | Facility: CLINIC | Age: 56
End: 2021-11-23

## 2021-12-06 DIAGNOSIS — G43.911 INTRACTABLE MIGRAINE WITH STATUS MIGRAINOSUS, UNSPECIFIED MIGRAINE TYPE: ICD-10-CM

## 2021-12-06 RX ORDER — BUTALBITAL, ACETAMINOPHEN AND CAFFEINE 50; 325; 40 MG/1; MG/1; MG/1
1 TABLET ORAL EVERY 6 HOURS PRN
Qty: 12 TABLET | Refills: 0 | Status: SHIPPED | OUTPATIENT
Start: 2021-12-06

## 2021-12-27 DIAGNOSIS — G43.119 INTRACTABLE MIGRAINE WITH AURA WITHOUT STATUS MIGRAINOSUS: ICD-10-CM

## 2021-12-27 RX ORDER — ERENUMAB-AOOE 140 MG/ML
INJECTION, SOLUTION SUBCUTANEOUS
Qty: 1 ML | Refills: 2 | Status: SHIPPED | OUTPATIENT
Start: 2021-12-27 | End: 2022-05-03

## 2021-12-28 ENCOUNTER — TELEPHONE (OUTPATIENT)
Dept: FAMILY MEDICINE CLINIC | Facility: CLINIC | Age: 56
End: 2021-12-28

## 2021-12-28 ENCOUNTER — OFFICE VISIT (OUTPATIENT)
Dept: FAMILY MEDICINE CLINIC | Facility: CLINIC | Age: 56
End: 2021-12-28
Payer: COMMERCIAL

## 2021-12-28 VITALS
HEART RATE: 86 BPM | WEIGHT: 155 LBS | RESPIRATION RATE: 18 BRPM | SYSTOLIC BLOOD PRESSURE: 116 MMHG | OXYGEN SATURATION: 97 % | TEMPERATURE: 96.5 F | DIASTOLIC BLOOD PRESSURE: 78 MMHG | BODY MASS INDEX: 26.46 KG/M2 | HEIGHT: 64 IN

## 2021-12-28 DIAGNOSIS — F41.9 ANXIETY: ICD-10-CM

## 2021-12-28 DIAGNOSIS — E55.9 VITAMIN D DEFICIENCY: ICD-10-CM

## 2021-12-28 DIAGNOSIS — Z00.00 ANNUAL PHYSICAL EXAM: Primary | ICD-10-CM

## 2021-12-28 DIAGNOSIS — F32.A DEPRESSION, UNSPECIFIED DEPRESSION TYPE: ICD-10-CM

## 2021-12-28 DIAGNOSIS — E78.2 MULTIPLE-TYPE HYPERLIPIDEMIA: ICD-10-CM

## 2021-12-28 PROBLEM — K57.92 DIVERTICULITIS: Status: RESOLVED | Noted: 2021-08-10 | Resolved: 2021-12-28

## 2021-12-28 PROBLEM — R10.32 LLQ ABDOMINAL PAIN: Status: RESOLVED | Noted: 2021-08-10 | Resolved: 2021-12-28

## 2021-12-28 PROCEDURE — 3008F BODY MASS INDEX DOCD: CPT | Performed by: NURSE PRACTITIONER

## 2021-12-28 PROCEDURE — 1036F TOBACCO NON-USER: CPT | Performed by: NURSE PRACTITIONER

## 2021-12-28 PROCEDURE — 3725F SCREEN DEPRESSION PERFORMED: CPT | Performed by: NURSE PRACTITIONER

## 2021-12-28 PROCEDURE — 36415 COLL VENOUS BLD VENIPUNCTURE: CPT | Performed by: NURSE PRACTITIONER

## 2021-12-28 PROCEDURE — 99396 PREV VISIT EST AGE 40-64: CPT | Performed by: NURSE PRACTITIONER

## 2021-12-29 LAB
25(OH)D3+25(OH)D2 SERPL-MCNC: 23.2 NG/ML (ref 30–100)
ALBUMIN SERPL-MCNC: 5 G/DL (ref 3.8–4.9)
ALBUMIN/GLOB SERPL: 1.6 {RATIO} (ref 1.2–2.2)
ALP SERPL-CCNC: 112 IU/L (ref 44–121)
ALT SERPL-CCNC: 21 IU/L (ref 0–32)
AST SERPL-CCNC: 23 IU/L (ref 0–40)
BASOPHILS # BLD AUTO: 0 X10E3/UL (ref 0–0.2)
BASOPHILS NFR BLD AUTO: 1 %
BILIRUB SERPL-MCNC: <0.2 MG/DL (ref 0–1.2)
BUN SERPL-MCNC: 12 MG/DL (ref 6–24)
BUN/CREAT SERPL: 19 (ref 9–23)
CALCIUM SERPL-MCNC: 10.3 MG/DL (ref 8.7–10.2)
CHLORIDE SERPL-SCNC: 101 MMOL/L (ref 96–106)
CHOLEST SERPL-MCNC: 277 MG/DL (ref 100–199)
CHOLEST/HDLC SERPL: 3.1 RATIO (ref 0–4.4)
CO2 SERPL-SCNC: 25 MMOL/L (ref 20–29)
CREAT SERPL-MCNC: 0.62 MG/DL (ref 0.57–1)
EOSINOPHIL # BLD AUTO: 0.2 X10E3/UL (ref 0–0.4)
EOSINOPHIL NFR BLD AUTO: 3 %
ERYTHROCYTE [DISTWIDTH] IN BLOOD BY AUTOMATED COUNT: 13.2 % (ref 11.7–15.4)
GLOBULIN SER-MCNC: 3.1 G/DL (ref 1.5–4.5)
GLUCOSE SERPL-MCNC: 93 MG/DL (ref 65–99)
HCT VFR BLD AUTO: 41.6 % (ref 34–46.6)
HDLC SERPL-MCNC: 89 MG/DL
HGB BLD-MCNC: 13.8 G/DL (ref 11.1–15.9)
IMM GRANULOCYTES # BLD: 0 X10E3/UL (ref 0–0.1)
IMM GRANULOCYTES NFR BLD: 0 %
LDLC SERPL CALC-MCNC: 172 MG/DL (ref 0–99)
LYMPHOCYTES # BLD AUTO: 1.6 X10E3/UL (ref 0.7–3.1)
LYMPHOCYTES NFR BLD AUTO: 31 %
MCH RBC QN AUTO: 26.7 PG (ref 26.6–33)
MCHC RBC AUTO-ENTMCNC: 33.2 G/DL (ref 31.5–35.7)
MCV RBC AUTO: 81 FL (ref 79–97)
MONOCYTES # BLD AUTO: 0.4 X10E3/UL (ref 0.1–0.9)
MONOCYTES NFR BLD AUTO: 7 %
NEUTROPHILS # BLD AUTO: 3 X10E3/UL (ref 1.4–7)
NEUTROPHILS NFR BLD AUTO: 58 %
PLATELET # BLD AUTO: 260 X10E3/UL (ref 150–450)
POTASSIUM SERPL-SCNC: 4.5 MMOL/L (ref 3.5–5.2)
PROT SERPL-MCNC: 8.1 G/DL (ref 6–8.5)
RBC # BLD AUTO: 5.16 X10E6/UL (ref 3.77–5.28)
SL AMB EGFR AFRICAN AMERICAN: 117 ML/MIN/1.73
SL AMB EGFR NON AFRICAN AMERICAN: 101 ML/MIN/1.73
SL AMB VLDL CHOLESTEROL CALC: 16 MG/DL (ref 5–40)
SODIUM SERPL-SCNC: 140 MMOL/L (ref 134–144)
TRIGL SERPL-MCNC: 97 MG/DL (ref 0–149)
WBC # BLD AUTO: 5.2 X10E3/UL (ref 3.4–10.8)

## 2022-01-13 ENCOUNTER — TELEPHONE (OUTPATIENT)
Dept: NEUROLOGY | Facility: CLINIC | Age: 57
End: 2022-01-13

## 2022-01-13 NOTE — TELEPHONE ENCOUNTER
LMOM for patient to call back regarding 01/17/22 appointment      Appointment needs to be switched to a virtual appointment

## 2022-02-03 ENCOUNTER — TELEPHONE (OUTPATIENT)
Dept: PSYCHIATRY | Facility: CLINIC | Age: 57
End: 2022-02-03

## 2022-04-28 ENCOUNTER — TELEPHONE (OUTPATIENT)
Dept: NEUROLOGY | Facility: CLINIC | Age: 57
End: 2022-04-28

## 2022-04-28 NOTE — TELEPHONE ENCOUNTER
Pt calling to confirm if PA for Kiera Oro is still valid as she will be contacting her pharmacy for refill  Advised her this is approved until 5/23/22  Pt asking for our office to submit reauthorization when appropriate to ensure no lapse in therapy  Pt reports Kiera Oro has been very helpful  Previously had severe "immobilizing" migraines that would last several days  At times had migraines 3x per week  Now has had no severe migraines  Only has 2 migraines per month and they are less severe  Pt accepted f/u appt with Courtney Srivastava on 8/26/22  PA submitted on CMM, Key: [id number]  Awaiting determination

## 2022-06-08 DIAGNOSIS — F41.9 ANXIETY: ICD-10-CM

## 2022-06-09 RX ORDER — ALPRAZOLAM 0.25 MG/1
TABLET ORAL
Qty: 30 TABLET | Refills: 0 | Status: SHIPPED | OUTPATIENT
Start: 2022-06-09

## 2022-06-09 NOTE — TELEPHONE ENCOUNTER
Requested medication(s) are due for refill today: Yes  Patient has already received a courtesy refill: No  Other reason request has been forwarded to provider:   Psychiatry:  Anxiolytics/Hypnotics Failed 06/08/2022 12:24 PM   Protocol Details  This refill cannot be delegated

## 2022-06-30 ENCOUNTER — TELEPHONE (OUTPATIENT)
Dept: PSYCHIATRY | Facility: CLINIC | Age: 57
End: 2022-06-30

## 2022-07-08 ENCOUNTER — TELEPHONE (OUTPATIENT)
Dept: PSYCHIATRY | Facility: CLINIC | Age: 57
End: 2022-07-08

## 2022-08-05 ENCOUNTER — TELEPHONE (OUTPATIENT)
Dept: PSYCHIATRY | Facility: CLINIC | Age: 57
End: 2022-08-05

## 2022-08-19 ENCOUNTER — TELEPHONE (OUTPATIENT)
Dept: NEUROLOGY | Facility: CLINIC | Age: 57
End: 2022-08-19

## 2022-09-06 DIAGNOSIS — G43.911 INTRACTABLE MIGRAINE WITH STATUS MIGRAINOSUS, UNSPECIFIED MIGRAINE TYPE: ICD-10-CM

## 2022-09-06 RX ORDER — BUTALBITAL, ACETAMINOPHEN AND CAFFEINE 50; 325; 40 MG/1; MG/1; MG/1
1 TABLET ORAL EVERY 6 HOURS PRN
Qty: 12 TABLET | Refills: 0 | Status: SHIPPED | OUTPATIENT
Start: 2022-09-06 | End: 2023-01-23 | Stop reason: SDUPTHER

## 2022-09-06 NOTE — TELEPHONE ENCOUNTER
Patient left VM on refill line requesting refill on the following :    Fioricet -40 mg tabs      Last OV was 10/6/21    Next appt is 01/03/23    Please refill if agreeable.

## 2022-09-08 ENCOUNTER — TELEPHONE (OUTPATIENT)
Dept: PSYCHIATRY | Facility: CLINIC | Age: 57
End: 2022-09-08

## 2022-09-16 NOTE — TELEPHONE ENCOUNTER
Patient called in stating she requested a prescription refill over a week ago and has not heard anything back yet from us and there is still no refill at her pharmacy. Patient stated she is now completely out of her medication and would really appreciate if a refill could be sent over to her pharmacy today. Patient has a follow up appointment scheduled for 1/3/23 with Subhash. Patient said her pharmacy is [facility], phone #[phone]. Thank you!

## 2022-09-20 RX ORDER — EVOLOCUMAB 140 MG/ML
INJECTION, SOLUTION SUBCUTANEOUS
COMMUNITY
Start: 2022-09-04

## 2022-09-21 ENCOUNTER — OFFICE VISIT (OUTPATIENT)
Dept: FAMILY MEDICINE CLINIC | Facility: CLINIC | Age: 57
End: 2022-09-21
Payer: COMMERCIAL

## 2022-09-21 VITALS
SYSTOLIC BLOOD PRESSURE: 112 MMHG | BODY MASS INDEX: 25.44 KG/M2 | WEIGHT: 149 LBS | TEMPERATURE: 97.3 F | RESPIRATION RATE: 16 BRPM | DIASTOLIC BLOOD PRESSURE: 74 MMHG | HEART RATE: 67 BPM | OXYGEN SATURATION: 97 % | HEIGHT: 64 IN

## 2022-09-21 DIAGNOSIS — I25.10 CORONARY ARTERY DISEASE INVOLVING NATIVE HEART WITHOUT ANGINA PECTORIS, UNSPECIFIED VESSEL OR LESION TYPE: Primary | ICD-10-CM

## 2022-09-21 DIAGNOSIS — F32.A DEPRESSION, UNSPECIFIED DEPRESSION TYPE: ICD-10-CM

## 2022-09-21 DIAGNOSIS — E78.2 MULTIPLE-TYPE HYPERLIPIDEMIA: ICD-10-CM

## 2022-09-21 DIAGNOSIS — E55.9 VITAMIN D DEFICIENCY: ICD-10-CM

## 2022-09-21 DIAGNOSIS — F41.9 ANXIETY: ICD-10-CM

## 2022-09-21 PROCEDURE — 36415 COLL VENOUS BLD VENIPUNCTURE: CPT | Performed by: NURSE PRACTITIONER

## 2022-09-21 PROCEDURE — 3725F SCREEN DEPRESSION PERFORMED: CPT | Performed by: NURSE PRACTITIONER

## 2022-09-21 PROCEDURE — 99214 OFFICE O/P EST MOD 30 MIN: CPT | Performed by: NURSE PRACTITIONER

## 2022-09-21 RX ORDER — SERTRALINE HYDROCHLORIDE 25 MG/1
25 TABLET, FILM COATED ORAL DAILY
Qty: 90 TABLET | Refills: 1 | Status: SHIPPED | OUTPATIENT
Start: 2022-09-21

## 2022-09-21 NOTE — PROGRESS NOTES
Name: Ash Peña      : 1965      MRN: [id number]  Encounter Provider: HELENA Beltran  Encounter Date: 2022   Encounter department: [address] [place] [address]  Depression, unspecified depression type  tress management  Activities to divert attention when possible  Conscious breathing techniques as discussed  Coping mechanisms and strategies vary from person to person so try to utilize strategies that you think may work for you (such as meditation, music, etc  )  Consider or continue counseling as discussed  Anti anxiety/depression medications as prescribed  Start Zoloft 25mg daily   Xanax as needed  - sertraline (ZOLOFT) 25 mg tablet; Take 1 tablet (25 mg total) by mouth daily  Dispense: 90 tablet; Refill: 1    2  Anxiety  tress management  Activities to divert attention when possible  Conscious breathing techniques as discussed  Coping mechanisms and strategies vary from person to person so try to utilize strategies that you think may work for you (such as meditation, music, etc  )  Consider or continue counseling as discussed  Anti anxiety/depression medications as prescribed  Start Zoloft 25mg daily   Xanax as needed  - sertraline (ZOLOFT) 25 mg tablet; Take 1 tablet (25 mg total) by mouth daily  Dispense: 90 tablet; Refill: 1    3  Coronary artery disease involving native heart without angina pectoris, unspecified vessel or lesion type  Stable  Managed by cardio  - CBC and differential  - Comprehensive metabolic panel  - Lipid panel    4  Multiple-type hyperlipidemia  Stable  Heart healthy diet  Repatha per cardio  Will check labs  - CBC and differential  - Comprehensive metabolic panel  - Lipid panel    5  Vitamin D deficiency  - CBC and differential  - Comprehensive metabolic panel  - Vitamin D 25 hydroxy      Depression Screening Follow-up Plan: Patient's depression screening was positive with a  PHQ-9 score  9   Patient assessed for underlying major depression  They have no active suicidal ideations  Brief counseling provided and recommend additional follow-up/re-evaluation next office visit  BMI Counseling: Body mass index is 25 58 kg/m²  The BMI is above normal  Nutrition recommendations include reducing portion sizes  Exercise recommendations include exercising 3-5 times per week  Subjective      Here for follow up  Increased stress with issues with teenager daughter recently  Used to take zoloft for anxiety   Wants to go back on zoloft   No suicidal ideation  No actual panic attacks  Has rx for xanax but has not used  No recent labs done  Seeing neuro and on Amiovig for migraines  Working really well  No recent migraines  Sees cardio regularly   Could not tolerate statin  Now on Repatha by cardio  Review of Systems   Constitutional: Negative for unexpected weight change  HENT: Negative for congestion  Eyes: Negative for visual disturbance  Respiratory: Negative for chest tightness and shortness of breath  Cardiovascular: Negative for chest pain and palpitations  Gastrointestinal: Positive for nausea (last few days and feels is stress related)  Negative for abdominal pain  Endocrine: Negative for polydipsia and polyuria  Genitourinary: Negative for dysuria and frequency  Musculoskeletal: Negative for arthralgias and myalgias  Skin: Negative for rash and wound  Allergic/Immunologic: Negative for immunocompromised state  Neurological: Negative for headaches (on amivog for migraines)  Psychiatric/Behavioral: Positive for dysphoric mood  Negative for self-injury and suicidal ideas  The patient is nervous/anxious          Current Outpatient Medications on File Prior to Visit   Medication Sig    Aimovig 140 MG/ML SOAJ INJECT SUBCUTANEOUSLY ONCE EVERY 30 DAYS    ALPRAZolam (XANAX) 0 25 mg tablet 1/2 to 1 tablet daily as needed    butalbital-acetaminophen-caffeine (FIORICET,ESGIC) -40 mg per tablet Take 1 tablet by mouth every 6 (six) hours as needed for headaches or migraine    cyclobenzaprine (FLEXERIL) 5 mg tablet Take 1 tablet (5 mg total) by mouth 2 (two) times a day as needed for muscle spasms    omeprazole (PriLOSEC) 40 MG capsule Take 1 capsule (40 mg total) by mouth daily as needed (indigestion)    Repatha SureClick 008 MG/ML SOAJ     sertraline (ZOLOFT) 25 mg tablet Take 1 5 tablets (37 5 mg total) by mouth daily    ondansetron (ZOFRAN) 4 mg tablet Take 1 tablet (4 mg total) by mouth every 8 (eight) hours as needed for nausea or vomiting (Patient not taking: Reported on 9/21/2022)    rosuvastatin (CRESTOR) 5 mg tablet Take 1 tablet (5 mg total) by mouth daily (Patient not taking: Reported on 9/21/2022)       Objective     /74   Pulse 67   Temp (!) 97 3 °F (36 3 °C) (Temporal)   Resp 16   Ht 5' 4" (1 626 m)   Wt 67 6 kg (149 lb)   SpO2 97%   BMI 25 58 kg/m²     Physical Exam  Vitals reviewed  Constitutional:       General: She is not in acute distress  Appearance: She is not ill-appearing  Eyes:      General: No scleral icterus  Pupils: Pupils are equal, round, and reactive to light  Cardiovascular:      Rate and Rhythm: Normal rate and regular rhythm  Pulmonary:      Effort: Pulmonary effort is normal  No respiratory distress  Breath sounds: Normal breath sounds  No wheezing or rales  Abdominal:      General: There is no distension  Palpations: Abdomen is soft  Musculoskeletal:      Right lower leg: No edema  Left lower leg: No edema  Skin:     General: Skin is warm and dry  Coloration: Skin is not jaundiced or pale  Neurological:      General: No focal deficit present  Mental Status: She is alert and oriented to person, place, and time  Cranial Nerves: No cranial nerve deficit  Sensory: No sensory deficit     Psychiatric:         Mood and Affect: Mood normal          Behavior: Behavior normal          Thought Content: Thought content normal          Judgment: Judgment normal       Comments: Well groomed  Dressed appropriately for the weather  Calm  Pleasant   Cooperative  Good eye contact  Converses freely and appropriately            HELENA Rawls

## 2022-09-21 NOTE — PATIENT INSTRUCTIONS
Stress management  Activities to divert attention when possible  Conscious breathing techniques as discussed  Coping mechanisms and strategies vary from person to person so try to utilize strategies that you think may work for you (such as meditation, music, etc  )  Consider or continue counseling as discussed  Anti anxiety/depression medications as prescribed  Start Zoloft 25mg daily   Xanax as needed  Continue all other medications

## 2022-09-22 LAB
25(OH)D3+25(OH)D2 SERPL-MCNC: 29.9 NG/ML (ref 30–100)
ALBUMIN SERPL-MCNC: 5.1 G/DL (ref 3.8–4.9)
ALBUMIN/GLOB SERPL: 1.9 {RATIO} (ref 1.2–2.2)
ALP SERPL-CCNC: 102 IU/L (ref 44–121)
ALT SERPL-CCNC: 14 IU/L (ref 0–32)
AST SERPL-CCNC: 16 IU/L (ref 0–40)
BASOPHILS # BLD AUTO: 0 X10E3/UL (ref 0–0.2)
BASOPHILS NFR BLD AUTO: 1 %
BILIRUB SERPL-MCNC: 0.3 MG/DL (ref 0–1.2)
BUN SERPL-MCNC: 14 MG/DL (ref 6–24)
BUN/CREAT SERPL: 24 (ref 9–23)
CALCIUM SERPL-MCNC: 10.3 MG/DL (ref 8.7–10.2)
CHLORIDE SERPL-SCNC: 103 MMOL/L (ref 96–106)
CHOLEST SERPL-MCNC: 168 MG/DL (ref 100–199)
CHOLEST/HDLC SERPL: 2.2 RATIO (ref 0–4.4)
CO2 SERPL-SCNC: 23 MMOL/L (ref 20–29)
CREAT SERPL-MCNC: 0.59 MG/DL (ref 0.57–1)
EGFR: 105 ML/MIN/1.73
EOSINOPHIL # BLD AUTO: 0.1 X10E3/UL (ref 0–0.4)
EOSINOPHIL NFR BLD AUTO: 2 %
ERYTHROCYTE [DISTWIDTH] IN BLOOD BY AUTOMATED COUNT: 13.3 % (ref 11.7–15.4)
GLOBULIN SER-MCNC: 2.7 G/DL (ref 1.5–4.5)
GLUCOSE SERPL-MCNC: 98 MG/DL (ref 65–99)
HCT VFR BLD AUTO: 40.4 % (ref 34–46.6)
HDLC SERPL-MCNC: 78 MG/DL
HGB BLD-MCNC: 13.4 G/DL (ref 11.1–15.9)
IMM GRANULOCYTES # BLD: 0 X10E3/UL (ref 0–0.1)
IMM GRANULOCYTES NFR BLD: 0 %
LDLC SERPL CALC-MCNC: 77 MG/DL (ref 0–99)
LYMPHOCYTES # BLD AUTO: 1.4 X10E3/UL (ref 0.7–3.1)
LYMPHOCYTES NFR BLD AUTO: 26 %
MCH RBC QN AUTO: 27.2 PG (ref 26.6–33)
MCHC RBC AUTO-ENTMCNC: 33.2 G/DL (ref 31.5–35.7)
MCV RBC AUTO: 82 FL (ref 79–97)
MONOCYTES # BLD AUTO: 0.3 X10E3/UL (ref 0.1–0.9)
MONOCYTES NFR BLD AUTO: 6 %
NEUTROPHILS # BLD AUTO: 3.4 X10E3/UL (ref 1.4–7)
NEUTROPHILS NFR BLD AUTO: 65 %
PLATELET # BLD AUTO: 227 X10E3/UL (ref 150–450)
POTASSIUM SERPL-SCNC: 4.5 MMOL/L (ref 3.5–5.2)
PROT SERPL-MCNC: 7.8 G/DL (ref 6–8.5)
RBC # BLD AUTO: 4.92 X10E6/UL (ref 3.77–5.28)
SL AMB VLDL CHOLESTEROL CALC: 13 MG/DL (ref 5–40)
SODIUM SERPL-SCNC: 143 MMOL/L (ref 134–144)
TRIGL SERPL-MCNC: 66 MG/DL (ref 0–149)
WBC # BLD AUTO: 5.3 X10E3/UL (ref 3.4–10.8)

## 2022-09-23 ENCOUNTER — TELEPHONE (OUTPATIENT)
Dept: PSYCHIATRY | Facility: CLINIC | Age: 57
End: 2022-09-23

## 2022-09-23 NOTE — TELEPHONE ENCOUNTER
mailbox is full unable leave message for pt to return call to intake regarding scheduling for p-kristi   Once pt returns call transfer call to me please

## 2022-10-27 ENCOUNTER — TELEPHONE (OUTPATIENT)
Dept: PSYCHIATRY | Facility: CLINIC | Age: 57
End: 2022-10-27

## 2022-10-27 NOTE — TELEPHONE ENCOUNTER
left message for pt to return call to intake in regards to scheduling services in [facility]

## 2022-11-28 ENCOUNTER — TELEPHONE (OUTPATIENT)
Dept: NEUROLOGY | Facility: CLINIC | Age: 57
End: 2022-11-28

## 2022-11-29 ENCOUNTER — OFFICE VISIT (OUTPATIENT)
Dept: NEUROLOGY | Facility: CLINIC | Age: 57
End: 2022-11-29

## 2022-11-29 VITALS
SYSTOLIC BLOOD PRESSURE: 115 MMHG | TEMPERATURE: 96.7 F | HEIGHT: 64 IN | WEIGHT: 151 LBS | DIASTOLIC BLOOD PRESSURE: 74 MMHG | BODY MASS INDEX: 25.78 KG/M2 | OXYGEN SATURATION: 96 % | HEART RATE: 89 BPM

## 2022-11-29 DIAGNOSIS — G44.1 VASCULAR HEADACHE: ICD-10-CM

## 2022-11-29 DIAGNOSIS — F45.41 STRESS HEADACHES: ICD-10-CM

## 2022-11-29 DIAGNOSIS — G43.019 INTRACTABLE MIGRAINE WITHOUT AURA AND WITHOUT STATUS MIGRAINOSUS: Primary | ICD-10-CM

## 2022-11-29 DIAGNOSIS — N28.9 RENAL DYSFUNCTION: ICD-10-CM

## 2022-11-29 DIAGNOSIS — R51.9 TEMPORAL HEADACHE: ICD-10-CM

## 2022-11-29 RX ORDER — CYCLOBENZAPRINE HCL 10 MG
5 TABLET ORAL
Qty: 30 TABLET | Refills: 2 | Status: SHIPPED | OUTPATIENT
Start: 2022-11-29

## 2022-11-29 NOTE — PROGRESS NOTES
Return NeuroOutpatient Note        Heather Vegas  [id number]  84 y o   1965       Intractable migraine with aura without status migrainosus and  Cervicogenic headache        History obtained from:  Patient     HPI/Subjective:    Heather Vegas is a 61 yo F with PMH of migraines returns as f/u  She was last seen by us in oct of 2021  She has h/o migraines since her 19's  Prior to that, patient was seeing Rosalina Reyes  Patient has h/o cervical disc disease  Patient had reported  headaches with nausea, vomiting  She has described pain across forehead, over eyes  Comes on as wave  Describes dull ache  Intensity is around 8/10, which can last about 3 days, on average 8-12 hours  She finds herself in daze during time of headache  Weather change can be trigger  Frequency can be 2-3 days a week but she can go without one for 1-3 months  At last visit, we had started her on aimovig which she feels has markedly improved frequency to a point where she was getting none at one point  fioricet works as well for breakthrough ones  Intensity of her headaches now is 4/10 from 10/10 since start of aimovig  For past month, however she feels sharp right temporal region pain which is very brief that lasts 1-2 seconds  She has been in a lot of stress at home with her daughter  She also reports associated nausea  She was prescribed verapamil in past but didn't take it for long  She thinks she's tried elavil, topamax in past    Patient has been told by her GI to not take nsaids due to gastritis and was asked not to take triptans by cardiologist     Patient takes care of her adopted daughter who has a lot of disabilities       Past Medical History:   Diagnosis Date   • Abdominal pain     LLQ   • Anxiety    • CAD (coronary artery disease)     slight blockage   • Contact lens/glasses fitting    • Diverticulitis 8/10/2021   • Diverticulosis    • LLQ abdominal pain 8/10/2021   • Migraine    • Migraines      Social History Socioeconomic History   • Marital status:      Spouse name: Not on file   • Number of children: Not on file   • Years of education: Not on file   • Highest education level: Not on file   Occupational History   • Not on file   Tobacco Use   • Smoking status: Never   • Smokeless tobacco: Never   Vaping Use   • Vaping Use: Never used   Substance and Sexual Activity   • Alcohol use: Yes     Comment: socially   • Drug use: No   • Sexual activity: Not Currently     Birth control/protection: Post-menopausal   Other Topics Concern   • Not on file   Social History Narrative   • Not on file     Social Determinants of Health     Financial Resource Strain: Not on file   Food Insecurity: Not on file   Transportation Needs: Not on file   Physical Activity: Not on file   Stress: Not on file   Social Connections: Not on file   Intimate Partner Violence: Not on file   Housing Stability: Not on file     Family History   Problem Relation Age of Onset   • No Known Problems Mother    • Heart disease Father    • Substance Abuse Neg Hx    • Mental illness Neg Hx      Allergies   Allergen Reactions   • Penicillins Hives     Current Outpatient Medications on File Prior to Visit   Medication Sig Dispense Refill   • Aimovig 140 MG/ML SOAJ INJECT SUBCUTANEOUSLY ONCE EVERY 30 DAYS 1 mL 5   • ALPRAZolam (XANAX) 0 25 mg tablet 1/2 to 1 tablet daily as needed 30 tablet 0   • butalbital-acetaminophen-caffeine (FIORICET,ESGIC) -40 mg per tablet Take 1 tablet by mouth every 6 (six) hours as needed for headaches or migraine 12 tablet 0   • omeprazole (PriLOSEC) 40 MG capsule Take 1 capsule (40 mg total) by mouth daily as needed (indigestion) 30 capsule 3   • ondansetron (ZOFRAN) 4 mg tablet Take 1 tablet (4 mg total) by mouth every 8 (eight) hours as needed for nausea or vomiting 20 tablet 0   • Repatha SureClick 577 MG/ML SOAJ      • sertraline (ZOLOFT) 25 mg tablet Take 1 tablet (25 mg total) by mouth daily 90 tablet 1   • [DISCONTINUED] cyclobenzaprine (FLEXERIL) 5 mg tablet Take 1 tablet (5 mg total) by mouth 2 (two) times a day as needed for muscle spasms 30 tablet 0     No current facility-administered medications on file prior to visit  Review of Systems   Refer to positive review of systems in HPI     Review of Systems    Constitutional- No fever  Eyes- No visual change  ENT- Hearing normal  CV- No chest pain  Resp- No Shortness of breath  GI- No diarrhea  - Bladder normal  MS- No Arthritis   Skin- No rash  Psych- No depression  Endo- No DM  Heme- No nodes    Vitals:    11/29/22 1542   BP: 115/74   BP Location: Left arm   Patient Position: Sitting   Cuff Size: Standard   Pulse: 89   Temp: (!) 96 7 °F (35 9 °C)   TempSrc: Tympanic   SpO2: 96%   Weight: 68 5 kg (151 lb)   Height: 5' 4" (1 626 m)       PHYSICAL EXAM:  Appearance: No Acute Distress  Ophthalmoscopic: Disc Flat, Normal fundus  Mental status:  Orientation: Awake, Alert, and Orientedx3  Memory: Registation 3/3 Recall 3/3  Attention: normal  Knowledge: good  Language: No aphasia  Speech: No dysarthria  Cranial Nerves:  2 No Visual Defect on Confrontation, Pupils round, equal, reactive to light  3,4,6 Extraocular Movements Intact, no nystagmus  5 Facial Sensation Intact  7 No facial asymmetry  8 Intact hearing  9,10 Palate symmetric, normal gag  11 Good shoulder shrug  12 Tongue Midline  Gait: Stable  Coordination: No ataxia with finger to nose testing, and heel to shin  Sensory: Intact, Symmetric to pinprick, light touch, vibration, and joint position  Muscle Tone: Normal              Muscle exam:  Arm Right Left Leg Right Left   Deltoid 5/5 5/5 Iliopsoas 5/5 5/5   Biceps 5/5 5/5 Quads 5/5 5/5   Triceps 5/5 5/5 Hamstrings 5/5 5/5   Wrist Extension 5/5 5/5 Ankle Dorsi Flexion 5/5 5/5   Wrist Flexion 5/5 5/5 Ankle Plantar Flexion 5/5 5/5   Interossei 5/5 5/5 Ankle Eversion 5/5 5/5   APB 5/5 5/5 Ankle Inversion 5/5 5/5       Reflexes   RJ BJ TJ KJ AJ Plantars Valdez's   Right 2+ 2+ 2+ 2+ 2+ Downgoing Not present   Left 2+ 2+ 2+ 2+ 2+ Downgoing Not present     Personal review of  Labs:                    Diagnoses and all orders for this visit:      1  Intractable migraine without aura and without status migrainosus  cyclobenzaprine (FLEXERIL) 10 mg tablet      2  Temporal headache  Sedimentation rate, automated    C-reactive protein    Sedimentation rate, automated    C-reactive protein      3  Stress headaches        4  Vascular headache  CTA head and neck w wo contrast      5  Renal dysfunction  BUN    Creatinine, serum    BUN    Creatinine, serum            Patient has been doing very well in terms of her migraines with aimovig  She does however have new onset, different type of headache for which will check inflammatory markers and obtain one time CTA head and neck  Discussed her concerns with her daughter at home               Total time of encounter:  40 min  More than 50% of the time was used in counseling and/or coordination of care  Extent of counseling and/or coordination of care        Nicole Millard MD  [facility]  [address]  [address]  [phone]

## 2022-12-06 ENCOUNTER — OFFICE VISIT (OUTPATIENT)
Dept: FAMILY MEDICINE CLINIC | Facility: CLINIC | Age: 57
End: 2022-12-06

## 2022-12-06 VITALS
DIASTOLIC BLOOD PRESSURE: 70 MMHG | OXYGEN SATURATION: 98 % | RESPIRATION RATE: 16 BRPM | SYSTOLIC BLOOD PRESSURE: 110 MMHG | HEIGHT: 64 IN | HEART RATE: 85 BPM | BODY MASS INDEX: 26.12 KG/M2 | WEIGHT: 153 LBS | TEMPERATURE: 97.2 F

## 2022-12-06 DIAGNOSIS — R59.0 ANTERIOR AURICULAR LYMPHADENOPATHY: Primary | ICD-10-CM

## 2022-12-06 DIAGNOSIS — H92.02: ICD-10-CM

## 2022-12-06 RX ORDER — NAPROXEN 500 MG/1
500 TABLET ORAL 2 TIMES DAILY WITH MEALS
Qty: 30 TABLET | Refills: 0 | Status: SHIPPED | OUTPATIENT
Start: 2022-12-06

## 2022-12-06 NOTE — PATIENT INSTRUCTIONS
Naprosyn 500mg twice daily with food for one week  If symptoms persist, will refer to ENT for further evaluation

## 2022-12-06 NOTE — PROGRESS NOTES
Name: Magali Seo      : 1965      MRN: [id number]  Encounter Provider: HELENA Lin  Encounter Date: 2022   Encounter department: [address] [place] [address]  Anterior auricular lymphadenopathy  Reactive node?   - naproxen (NAPROSYN) 500 mg tablet; Take 1 tablet (500 mg total) by mouth 2 (two) times a day with meals  Dispense: 30 tablet; Refill: 0    2  Pain of left ear structure  Naprosyn 500mg twice daily with food for one week  If symptoms persist, will refer to ENT for further evaluation  - naproxen (NAPROSYN) 500 mg tablet; Take 1 tablet (500 mg total) by mouth 2 (two) times a day with meals  Dispense: 30 tablet; Refill: 0      Patient was counseled regarding instructions for management which included: impression/diagnosis, risk/benefits of treatment options, importance of compliance with treatment, risk factor reduction, and prognosis  I have reviewed the instructions with the patient answering all questions and patient verbalized understanding  Subjective      Here for left ear pain, points to tragus  Pain radiating to jaw at times  Symptoms for one month  Getting worse  No uri symptoms  No fever, no sore throat, no drainage from ear  Review of Systems   Constitutional: Negative for fever  HENT: Positive for ear pain  Negative for congestion, ear discharge, sinus pressure and sinus pain  Skin: Negative for wound  Allergic/Immunologic: Negative for immunocompromised state  Neurological: Negative for dizziness         Current Outpatient Medications on File Prior to Visit   Medication Sig   • Aimovig 140 MG/ML SOAJ INJECT SUBCUTANEOUSLY ONCE EVERY 30 DAYS   • ALPRAZolam (XANAX) 0 25 mg tablet 1/2 to 1 tablet daily as needed   • butalbital-acetaminophen-caffeine (FIORICET,ESGIC) -40 mg per tablet Take 1 tablet by mouth every 6 (six) hours as needed for headaches or migraine   • cyclobenzaprine (FLEXERIL) 10 mg tablet Take 0 5 tablets (5 mg total) by mouth daily at bedtime   • omeprazole (PriLOSEC) 40 MG capsule Take 1 capsule (40 mg total) by mouth daily as needed (indigestion)   • ondansetron (ZOFRAN) 4 mg tablet Take 1 tablet (4 mg total) by mouth every 8 (eight) hours as needed for nausea or vomiting   • Repatha SureClick 818 MG/ML SOAJ every 14 (fourteen) days   • sertraline (ZOLOFT) 25 mg tablet Take 1 tablet (25 mg total) by mouth daily       Objective     /70   Pulse 85   Temp (!) 97 2 °F (36 2 °C) (Temporal)   Resp 16   Ht 5' 4" (1 626 m)   Wt 69 4 kg (153 lb)   SpO2 98%   BMI 26 26 kg/m²     Physical Exam  Vitals reviewed  Constitutional:       Appearance: She is not ill-appearing  HENT:      Right Ear: Tympanic membrane and ear canal normal       Left Ear: Tympanic membrane and ear canal normal       Ears:      Comments: Left pre-auricular swelling and tenderness to palpation     Nose: No congestion  Mouth/Throat:      Mouth: Mucous membranes are moist       Pharynx: Oropharynx is clear  Cardiovascular:      Rate and Rhythm: Normal rate  Pulmonary:      Effort: Pulmonary effort is normal    Skin:     General: Skin is warm and dry  Neurological:      Mental Status: She is alert and oriented to person, place, and time     Psychiatric:         Mood and Affect: Mood normal          Behavior: Behavior normal        Catha Leisure

## 2022-12-07 DIAGNOSIS — K22.4 ESOPHAGEAL SPASM: ICD-10-CM

## 2022-12-08 RX ORDER — OMEPRAZOLE 40 MG/1
40 CAPSULE, DELAYED RELEASE ORAL DAILY PRN
Qty: 30 CAPSULE | Refills: 3 | Status: SHIPPED | OUTPATIENT
Start: 2022-12-08

## 2022-12-22 DIAGNOSIS — F32.A DEPRESSION, UNSPECIFIED DEPRESSION TYPE: ICD-10-CM

## 2022-12-22 DIAGNOSIS — F41.9 ANXIETY: ICD-10-CM

## 2022-12-22 RX ORDER — SERTRALINE HYDROCHLORIDE 25 MG/1
25 TABLET, FILM COATED ORAL DAILY
Qty: 90 TABLET | Refills: 0 | Status: SHIPPED | OUTPATIENT
Start: 2022-12-22

## 2023-01-23 DIAGNOSIS — G43.911 INTRACTABLE MIGRAINE WITH STATUS MIGRAINOSUS, UNSPECIFIED MIGRAINE TYPE: ICD-10-CM

## 2023-01-23 RX ORDER — BUTALBITAL, ACETAMINOPHEN AND CAFFEINE 50; 325; 40 MG/1; MG/1; MG/1
1 TABLET ORAL EVERY 6 HOURS PRN
Qty: 12 TABLET | Refills: 0 | Status: SHIPPED | OUTPATIENT
Start: 2023-01-23

## 2023-01-23 NOTE — TELEPHONE ENCOUNTER
Medication refill requested for butalbital-acetaminophen-caffeine (FIORICET,ESGIC) -40 mg per tablet    Last refilled on 09/06/22  L  O V 11/29/22  Medication pended

## 2023-03-01 ENCOUNTER — TELEPHONE (OUTPATIENT)
Dept: NEUROLOGY | Facility: CLINIC | Age: 58
End: 2023-03-01

## 2023-03-02 DIAGNOSIS — G43.119 INTRACTABLE MIGRAINE WITH AURA WITHOUT STATUS MIGRAINOSUS: ICD-10-CM

## 2023-03-02 RX ORDER — ERENUMAB-AOOE 140 MG/ML
140 INJECTION, SOLUTION SUBCUTANEOUS
Qty: 1 ML | Refills: 5 | Status: SHIPPED | OUTPATIENT
Start: 2023-03-02

## 2023-03-02 NOTE — TELEPHONE ENCOUNTER
Aimovig 140 MG/ML SOAJ [[id number]  Refill request for aimovig  Approved Prescriptions     Aimovig 140 MG/ML SOAJ         Sig: Inject 140 mg under the skin every 30 (thirty) days    Disp:  1 mL    Refills:  5    MARSHALL     Start: 3/2/2023    Class: Normal    Authorized by:  HELENA Pérez    For: Intractable migraine with aura without status migrainosus        To be filled at: [address] [facility], [place] [id number]

## 2023-03-03 NOTE — TELEPHONE ENCOUNTER
Pt LVM stating that she requested Kristophermario Carol yesterday but has not received confirmation that it was sent  Says she checked pharmacy yesterday but it wasn't there   -------------------------------------------------------    Spoke pt and advised pt that script was sent in yesterday at 6pm  Pt says she had called pharmacy before that time and will call them today  Pt expresses appreciation to [facility] for sending script  Nothing further at this time

## 2023-03-31 DIAGNOSIS — G43.911 INTRACTABLE MIGRAINE WITH STATUS MIGRAINOSUS, UNSPECIFIED MIGRAINE TYPE: ICD-10-CM

## 2023-03-31 RX ORDER — BUTALBITAL, ACETAMINOPHEN AND CAFFEINE 50; 325; 40 MG/1; MG/1; MG/1
1 TABLET ORAL EVERY 6 HOURS PRN
Qty: 12 TABLET | Refills: 0 | Status: SHIPPED | OUTPATIENT
Start: 2023-03-31

## 2023-05-16 ENCOUNTER — TELEPHONE (OUTPATIENT)
Dept: NEUROLOGY | Facility: CLINIC | Age: 58
End: 2023-05-16

## 2023-06-28 ENCOUNTER — TELEPHONE (OUTPATIENT)
Dept: NEUROLOGY | Facility: CLINIC | Age: 58
End: 2023-06-28

## 2023-07-08 ENCOUNTER — OFFICE VISIT (OUTPATIENT)
Dept: URGENT CARE | Facility: CLINIC | Age: 58
End: 2023-07-08
Payer: COMMERCIAL

## 2023-07-08 VITALS
OXYGEN SATURATION: 99 % | TEMPERATURE: 97.7 F | WEIGHT: 152 LBS | DIASTOLIC BLOOD PRESSURE: 80 MMHG | BODY MASS INDEX: 25.95 KG/M2 | RESPIRATION RATE: 18 BRPM | SYSTOLIC BLOOD PRESSURE: 110 MMHG | HEART RATE: 93 BPM | HEIGHT: 64 IN
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
## 2023-07-08 DIAGNOSIS — S80.861A INSECT BITE OF RIGHT LOWER LEG, INITIAL ENCOUNTER: Primary | ICD-10-CM

## 2023-07-08 PROCEDURE — 99203 OFFICE O/P NEW LOW 30 MIN: CPT | Performed by: PHYSICIAN ASSISTANT

## 2023-07-08 NOTE — PATIENT INSTRUCTIONS
Recommend supportive care consisting of topical antibiotic ointment to area and monitoring for any signs of infection. Also discussed over-the-counter antihistamine such as Claritin or Zyrtec for the next couple days. All patient's questions answered and is agreeable with this plan.

## 2023-07-08 NOTE — PROGRESS NOTES
[address]        NAME: Melida Sahni is a 62 y.o. female  : 1965    MRN: [id number]  DATE: 2023  TIME: 4:19 PM    Assessment and Plan   Insect bite of right lower leg, initial encounter [X30.991A, W57. XXXA]  1. Insect bite of right lower leg, initial encounter              Patient Instructions     Patient Instructions   Recommend supportive care consisting of topical antibiotic ointment to area and monitoring for any signs of infection. Also discussed over-the-counter antihistamine such as Claritin or Zyrtec for the next couple days. All patient's questions answered and is agreeable with this plan. Follow up with PCP in 3-5 days. Proceed to  ER if symptoms worsen. Chief Complaint     Chief Complaint   Patient presents with   • Insect Bite     Pt reports of insect bite on right leg. History of Present Illness       Patient is a 63-year-old female presenting today with insect bite of right lower leg x2 hours. Patient has PMH significant for anxiety. Patient notes a couple hours ago she was stung by a insect on her right shin, notes that she removed the stinger, states there is a small area of redness and now notes she has a slight upset stomach and feels slightly lightheaded. Denies ever being stung or bit by an insect in the past and wants to make sure she is not having an allergic reaction. Denies chest tightness, throat swelling, trouble swallowing, SOB, syncope, vomiting. Review of Systems   Review of Systems   Constitutional: Negative for fever. HENT: Negative for ear pain and sore throat. Eyes: Negative for itching. Respiratory: Negative for cough and shortness of breath. Cardiovascular: Negative for chest pain and palpitations. Gastrointestinal: Positive for nausea. Negative for abdominal pain and vomiting. Musculoskeletal: Negative for arthralgias and back pain. Skin: Negative for color change and rash.         See HPI   Neurological: Positive for light-headedness. Negative for dizziness, seizures and syncope. Psychiatric/Behavioral: The patient is nervous/anxious. All other systems reviewed and are negative.         Current Medications       Current Outpatient Medications:   •  Aimovig 140 MG/ML SOAJ, Inject 140 mg under the skin every 30 (thirty) days, Disp: 1 mL, Rfl: 5  •  ALPRAZolam (XANAX) 0.25 mg tablet, 1/2 to 1 tablet daily as needed, Disp: 30 tablet, Rfl: 0  •  butalbital-acetaminophen-caffeine (FIORICET,ESGIC) -40 mg per tablet, Take 1 tablet by mouth every 6 (six) hours as needed for headaches or migraine, Disp: 12 tablet, Rfl: 0  •  cyclobenzaprine (FLEXERIL) 10 mg tablet, Take 0.5 tablets (5 mg total) by mouth daily at bedtime, Disp: 30 tablet, Rfl: 2  •  fluticasone (FLONASE) 50 mcg/act nasal spray, 2 sprays into each nostril daily, Disp: 16 g, Rfl: 3  •  naproxen (NAPROSYN) 500 mg tablet, Take 1 tablet (500 mg total) by mouth 2 (two) times a day with meals, Disp: 30 tablet, Rfl: 0  •  omeprazole (PriLOSEC) 40 MG capsule, Take 1 capsule (40 mg total) by mouth daily as needed (indigestion), Disp: 30 capsule, Rfl: 3  •  ondansetron (ZOFRAN) 4 mg tablet, Take 1 tablet (4 mg total) by mouth every 8 (eight) hours as needed for nausea or vomiting, Disp: 20 tablet, Rfl: 0  •  Repatha SureClick 042 MG/ML SOAJ, every 14 (fourteen) days, Disp: , Rfl:   •  sertraline (ZOLOFT) 25 mg tablet, TAKE 1 TABLET (25 MG TOTAL) BY MOUTH DAILY, Disp: 90 tablet, Rfl: 0    Current Allergies     Allergies as of 07/08/2023 - Reviewed 07/08/2023   Allergen Reaction Noted   • Penicillins Hives 02/19/2017            The following portions of the patient's history were reviewed and updated as appropriate: allergies, current medications, past family history, past medical history, past social history, past surgical history and problem list.     Past Medical History:   Diagnosis Date   • Abdominal pain     LLQ   • Anxiety    • CAD (coronary artery disease)     slight blockage   • Contact lens/glasses fitting    • Diverticulitis 8/10/2021   • Diverticulosis    • LLQ abdominal pain 8/10/2021   • Migraine    • Migraines        Past Surgical History:   Procedure Laterality Date   • BREAST SURGERY Right     lumpectomy - benign   • COLONOSCOPY     • ECTOPIC PREGNANCY SURGERY     • KNEE SURGERY Right        Family History   Problem Relation Age of Onset   • No Known Problems Mother    • Heart disease Father    • Substance Abuse Neg Hx    • Mental illness Neg Hx          Medications have been verified. Objective   /80   Pulse 93   Temp 97.7 °F (36.5 °C)   Resp 18   Ht 5' 4" (1.626 m)   Wt 68.9 kg (152 lb)   SpO2 99%   BMI 26.09 kg/m²        Physical Exam     Physical Exam  Vitals and nursing note reviewed. Constitutional:       General: She is not in acute distress. Appearance: Normal appearance. She is not ill-appearing. Comments: Patient appears well and in good spirits   HENT:      Head: Normocephalic. Right Ear: Tympanic membrane, ear canal and external ear normal.      Left Ear: Tympanic membrane and external ear normal.      Nose: Nose normal.      Mouth/Throat:      Mouth: Mucous membranes are moist.      Pharynx: Oropharynx is clear. Cardiovascular:      Rate and Rhythm: Normal rate and regular rhythm. Pulses: Normal pulses. Heart sounds: Normal heart sounds. Pulmonary:      Effort: Pulmonary effort is normal.      Breath sounds: Normal breath sounds. Abdominal:      General: Abdomen is flat. Bowel sounds are normal.      Palpations: Abdomen is soft. Skin:     Comments: Small 1 cm circumferential area of redness on anterior aspect of right shin consistent with insect bite, no visible or retained foreign body, area is nontender to palpation   Neurological:      Mental Status: She is alert.

## 2023-07-11 ENCOUNTER — OFFICE VISIT (OUTPATIENT)
Dept: NEUROLOGY | Facility: CLINIC | Age: 58
End: 2023-07-11
Payer: COMMERCIAL

## 2023-07-11 VITALS
BODY MASS INDEX: 25.95 KG/M2 | WEIGHT: 152 LBS | TEMPERATURE: 96.8 F | SYSTOLIC BLOOD PRESSURE: 138 MMHG | HEIGHT: 64 IN | DIASTOLIC BLOOD PRESSURE: 70 MMHG | OXYGEN SATURATION: 99 %

## 2023-07-11 DIAGNOSIS — G43.911 INTRACTABLE MIGRAINE WITH STATUS MIGRAINOSUS, UNSPECIFIED MIGRAINE TYPE: ICD-10-CM

## 2023-07-11 DIAGNOSIS — G43.009 MIGRAINE WITHOUT AURA AND WITHOUT STATUS MIGRAINOSUS, NOT INTRACTABLE: Primary | ICD-10-CM

## 2023-07-11 PROCEDURE — 99214 OFFICE O/P EST MOD 30 MIN: CPT | Performed by: PSYCHIATRY & NEUROLOGY

## 2023-07-11 NOTE — PROGRESS NOTES
Return NeuroOutpatient Note        Juan Antonio Lew  [id number]  56 y.o.  1965       Renal dysfunction, Vascular headache,  Stress headaches, and  Temporal headache        History obtained from:  Patient     HPI/Subjective:  Juan Antonio Lew is a 63 yo F with PMH of migraines presents as f/u. Per my previous history, she has h/o migraines since her 19's. Prior to that, patient was seeing Melissa Martinez. Patient has h/o cervical disc disease. Patient had reported  headaches with nausea, vomiting. She has described pain across forehead, over eyes. Comes on as wave. Describes dull ache. Intensity is around 8/10, which can last about 3 days, on average 8-12 hours. She finds herself in daze during time of headache. Weather change can be trigger. Frequency can be 2-3 days a week but she can go without one for 1-3 months. Once we started her on Aimovig in Oct of 2021, her life change remarkably. She now rarely gets headaches even despite weather change, humidity. Now intensity when she gets them is even low. Prior to that, patient dealt with frequent migraines for 30 years.     She was prescribed verapamil in past but didn't take it for long. She thinks she's tried elavil, topamax in past.   Patient has been told by her GI to not take nsaids due to gastritis and was asked not to take triptans by cardiologist.     Patient takes care of her adopted daughter who has a lot of disabilities.        Past Medical History:   Diagnosis Date   • Abdominal pain     LLQ   • Anxiety    • CAD (coronary artery disease)     slight blockage   • Contact lens/glasses fitting    • Diverticulitis 8/10/2021   • Diverticulosis    • LLQ abdominal pain 8/10/2021   • Migraine    • Migraines      Social History     Socioeconomic History   • Marital status:      Spouse name: Not on file   • Number of children: Not on file   • Years of education: Not on file   • Highest education level: Not on file   Occupational History   • Not on file   Tobacco Use   • Smoking status: Never   • Smokeless tobacco: Never   Vaping Use   • Vaping Use: Never used   Substance and Sexual Activity   • Alcohol use: Yes     Comment: socially   • Drug use: No   • Sexual activity: Not Currently     Birth control/protection: Post-menopausal   Other Topics Concern   • Not on file   Social History Narrative   • Not on file     Social Determinants of Health     Financial Resource Strain: Not on file   Food Insecurity: Not on file   Transportation Needs: Not on file   Physical Activity: Not on file   Stress: Not on file   Social Connections: Not on file   Intimate Partner Violence: Not on file   Housing Stability: Not on file     Family History   Problem Relation Age of Onset   • No Known Problems Mother    • Heart disease Father    • Substance Abuse Neg Hx    • Mental illness Neg Hx      Allergies   Allergen Reactions   • Penicillins Hives     Current Outpatient Medications on File Prior to Visit   Medication Sig Dispense Refill   • Aimovig 140 MG/ML SOAJ Inject 140 mg under the skin every 30 (thirty) days 1 mL 5   • ALPRAZolam (XANAX) 0.25 mg tablet 1/2 to 1 tablet daily as needed 30 tablet 0   • butalbital-acetaminophen-caffeine (FIORICET,ESGIC) -40 mg per tablet Take 1 tablet by mouth every 6 (six) hours as needed for headaches or migraine 12 tablet 0   • Repatha SureClick 314 MG/ML SOAJ every 14 (fourteen) days     • cyclobenzaprine (FLEXERIL) 10 mg tablet Take 0.5 tablets (5 mg total) by mouth daily at bedtime (Patient not taking: Reported on 7/11/2023) 30 tablet 2   • fluticasone (FLONASE) 50 mcg/act nasal spray 2 sprays into each nostril daily (Patient not taking: Reported on 7/11/2023) 16 g 3   • naproxen (NAPROSYN) 500 mg tablet Take 1 tablet (500 mg total) by mouth 2 (two) times a day with meals (Patient not taking: Reported on 7/11/2023) 30 tablet 0   • omeprazole (PriLOSEC) 40 MG capsule Take 1 capsule (40 mg total) by mouth daily as needed (indigestion) (Patient not taking: Reported on 7/11/2023) 30 capsule 3   • ondansetron (ZOFRAN) 4 mg tablet Take 1 tablet (4 mg total) by mouth every 8 (eight) hours as needed for nausea or vomiting (Patient not taking: Reported on 7/11/2023) 20 tablet 0   • sertraline (ZOLOFT) 25 mg tablet TAKE 1 TABLET (25 MG TOTAL) BY MOUTH DAILY (Patient not taking: Reported on 7/11/2023) 90 tablet 0     No current facility-administered medications on file prior to visit. Review of Systems   Refer to positive review of systems in HPI.    Review of Systems    Constitutional- No fever  Eyes- No visual change  ENT- Hearing normal  CV- No chest pain  Resp- No Shortness of breath  GI- No diarrhea  - Bladder normal  MS- No Arthritis   Skin- No rash  Psych- No depression  Endo- No DM  Heme- No nodes    Vitals:    07/11/23 1421   BP: 138/70   BP Location: Left arm   Patient Position: Sitting   Cuff Size: Standard   Temp: (!) 96.8 °F (36 °C)   TempSrc: Tympanic   SpO2: 99%   Weight: 68.9 kg (152 lb)   Height: 5' 4" (1.626 m)       PHYSICAL EXAM:  Appearance: No Acute Distress  Ophthalmoscopic: Disc Flat, Normal fundus  Mental status:  Orientation: Awake, Alert, and Orientedx3  Memory: Registation 3/3 Recall 3/3  Attention: normal  Knowledge: good  Language: No aphasia  Speech: No dysarthria  Cranial Nerves:  2 No Visual Defect on Confrontation, Pupils round, equal, reactive to light  3,4,6 Extraocular Movements Intact, no nystagmus  5 Facial Sensation Intact  7 No facial asymmetry  8 Intact hearing  9,10 Palate symmetric, normal gag  11 Good shoulder shrug  12 Tongue Midline  Gait: Stable  Coordination: No ataxia with finger to nose testing, and heel to shin  Sensory: Intact, Symmetric to pinprick, light touch, vibration, and joint position  Muscle Tone: Normal              Muscle exam:  Arm Right Left Leg Right Left   Deltoid 5/5 5/5 Iliopsoas 5/5 5/5   Biceps 5/5 5/5 Quads 5/5 5/5   Triceps 5/5 5/5 Hamstrings 5/5 5/5   Wrist Extension 5/5 5/5 Ankle Dorsi Flexion 5/5 5/5   Wrist Flexion 5/5 5/5 Ankle Plantar Flexion 5/5 5/5   Interossei 5/5 5/5 Ankle Eversion 5/5 5/5   APB 5/5 5/5 Ankle Inversion 5/5 5/5       Reflexes   RJ BJ TJ KJ AJ Plantars Valdez's   Right 2+ 2+ 2+ 2+ 2+ Downgoing Not present   Left 2+ 2+ 2+ 2+ 2+ Downgoing Not present     Personal review of  Labs:                    Diagnoses and all orders for this visit:      1. Migraine without aura and without status migrainosus, not intractable        2. Intractable migraine with status migrainosus, unspecified migraine type          Patient has done very well on Aimovig 140mg monthly. Will resume. Her quality of life has improved significantly since starting this injection. She may resume prn fioricet.                  Total time of encounter:  30 min  More than 50% of the time was used in counseling and/or coordination of care  Extent of counseling and/or coordination of care        MD [facility]  [address]  [phone]

## 2023-07-28 ENCOUNTER — TELEPHONE (OUTPATIENT)
Dept: FAMILY MEDICINE CLINIC | Facility: CLINIC | Age: 58
End: 2023-07-28

## 2023-07-28 DIAGNOSIS — E78.2 MULTIPLE-TYPE HYPERLIPIDEMIA: Primary | ICD-10-CM

## 2023-07-28 DIAGNOSIS — Z00.00 ANNUAL PHYSICAL EXAM: ICD-10-CM

## 2023-07-28 DIAGNOSIS — E55.9 VITAMIN D DEFICIENCY: ICD-10-CM

## 2023-08-08 ENCOUNTER — TELEPHONE (OUTPATIENT)
Dept: FAMILY MEDICINE CLINIC | Facility: CLINIC | Age: 58
End: 2023-08-08

## 2023-08-08 DIAGNOSIS — Z13.29 SCREENING FOR THYROID DISORDER: ICD-10-CM

## 2023-08-08 DIAGNOSIS — Z13.1 SCREENING FOR DIABETES MELLITUS (DM): Primary | ICD-10-CM

## 2023-08-08 DIAGNOSIS — Z00.00 ANNUAL PHYSICAL EXAM: ICD-10-CM

## 2023-08-08 NOTE — TELEPHONE ENCOUNTER
Left message on patients voicemail advising patient to have blood work completed prior to the cpx appt 8/14.

## 2023-08-08 NOTE — TELEPHONE ENCOUNTER
Please advise that I will add thyroid and DM screening. If she is looking for more extensive labs, I can refer her to endocrine or she can ask gyn about hormone testing.

## 2023-08-08 NOTE — TELEPHONE ENCOUNTER
Patient called regarding blood work she is asking for hormonal and cortisol I explained to her that you dont order those and she should check with her gyn. Patient would like her thyroid checked?  ty

## 2023-09-08 DIAGNOSIS — G43.119 INTRACTABLE MIGRAINE WITH AURA WITHOUT STATUS MIGRAINOSUS: ICD-10-CM

## 2023-09-08 RX ORDER — ERENUMAB-AOOE 140 MG/ML
140 INJECTION, SOLUTION SUBCUTANEOUS
Qty: 1 ML | Refills: 4 | Status: SHIPPED | OUTPATIENT
Start: 2023-09-08

## 2023-10-13 ENCOUNTER — OFFICE VISIT (OUTPATIENT)
Dept: FAMILY MEDICINE CLINIC | Facility: CLINIC | Age: 58
End: 2023-10-13
Payer: COMMERCIAL

## 2023-10-13 ENCOUNTER — APPOINTMENT (OUTPATIENT)
Dept: RADIOLOGY | Facility: CLINIC | Age: 58
End: 2023-10-13
Payer: COMMERCIAL

## 2023-10-13 VITALS
TEMPERATURE: 96.9 F | OXYGEN SATURATION: 97 % | HEIGHT: 64 IN | HEART RATE: 87 BPM | DIASTOLIC BLOOD PRESSURE: 74 MMHG | BODY MASS INDEX: 25.44 KG/M2 | WEIGHT: 149 LBS | SYSTOLIC BLOOD PRESSURE: 118 MMHG

## 2023-10-13 DIAGNOSIS — J40 BRONCHITIS: ICD-10-CM

## 2023-10-13 DIAGNOSIS — J40 BRONCHITIS: Primary | ICD-10-CM

## 2023-10-13 PROCEDURE — 71046 X-RAY EXAM CHEST 2 VIEWS: CPT

## 2023-10-13 PROCEDURE — 99214 OFFICE O/P EST MOD 30 MIN: CPT | Performed by: NURSE PRACTITIONER

## 2023-10-13 RX ORDER — ALBUTEROL SULFATE 90 UG/1
2 AEROSOL, METERED RESPIRATORY (INHALATION) EVERY 6 HOURS PRN
Qty: 18 G | Refills: 1 | Status: SHIPPED | OUTPATIENT
Start: 2023-10-13

## 2023-10-13 RX ORDER — BENZONATATE 100 MG/1
100 CAPSULE ORAL 3 TIMES DAILY PRN
COMMUNITY
Start: 2023-10-10 | End: 2023-10-20

## 2023-10-13 RX ORDER — PREDNISONE 20 MG/1
20 TABLET ORAL DAILY
Qty: 5 TABLET | Refills: 0 | Status: SHIPPED | OUTPATIENT
Start: 2023-10-13 | End: 2023-10-18

## 2023-10-13 RX ORDER — PROGESTERONE 100 MG/1
CAPSULE ORAL
COMMUNITY
Start: 2023-09-08

## 2023-10-13 NOTE — PROGRESS NOTES
Name: Randolph Lei      : 1965      MRN: [id number]  Encounter Provider: HELENA Burch  Encounter Date: 10/13/2023   Encounter department: [address] [place] [address]   1. Bronchitis  You have been diagnosed with bronchitis. Bronchitis is a viral illness and antibiotics are not indicated but will check chest xray as discussed to assure not pneumonia. Cough medication as prescribed. Rescue inhaler 2 puffs every 4-6 hours as needed for shortness breath, chest tightness, bronchospasm, coughing fits. Prednisone has been prescribed for inflammation in the airways. Can continue to use tessalon perles as needed. - XR chest pa & lateral; Future  - albuterol (Ventolin HFA) 90 mcg/act inhaler; Inhale 2 puffs every 6 (six) hours as needed for wheezing  Dispense: 18 g; Refill: 1  - predniSONE 20 mg tablet; Take 1 tablet (20 mg total) by mouth daily for 5 days  Dispense: 5 tablet; Refill: 0           Subjective      Here for worsening bronchitis. Symptoms started about 2 weeks ago and then cough got worse. Seen in urgent care at [facility] on 10/10. Given rx for zpack. Only took for 2 days and stopped because "made her feel sick". Still coughing  No fevers  Taking tessalon perles as needed. Took one today. Think they help. Cough is worse at night. Review of Systems   Constitutional:  Negative for chills, diaphoresis, fatigue and fever. HENT:  Negative for congestion, sinus pressure and sinus pain. Respiratory:  Positive for cough, chest tightness, shortness of breath and wheezing. Cardiovascular:  Negative for chest pain, palpitations and leg swelling. Gastrointestinal:  Negative for abdominal pain, diarrhea, nausea and vomiting. Musculoskeletal:  Negative for back pain and myalgias. Skin:  Negative for color change and pallor. Neurological:  Negative for dizziness, weakness and headaches. Hematological:  Negative for adenopathy. Current Outpatient Medications on File Prior to Visit   Medication Sig    Aimovig 140 MG/ML SOAJ INJECT 140 MG UNDER THE SKIN EVERY 30 (THIRTY) DAYS    ALPRAZolam (XANAX) 0.25 mg tablet 1/2 to 1 tablet daily as needed    benzonatate (TESSALON PERLES) 100 mg capsule Take 100 mg by mouth Three times daily as needed    butalbital-acetaminophen-caffeine (FIORICET,ESGIC) -40 mg per tablet Take 1 tablet by mouth every 6 (six) hours as needed for headaches or migraine    Progesterone 100 MG CAPS     Repatha SureClick 122 MG/ML SOAJ every 14 (fourteen) days    sertraline (ZOLOFT) 25 mg tablet TAKE 1 TABLET (25 MG TOTAL) BY MOUTH DAILY    [DISCONTINUED] cyclobenzaprine (FLEXERIL) 10 mg tablet Take 0.5 tablets (5 mg total) by mouth daily at bedtime (Patient not taking: Reported on 7/11/2023)    [DISCONTINUED] fluticasone (FLONASE) 50 mcg/act nasal spray 2 sprays into each nostril daily (Patient not taking: Reported on 7/11/2023)    [DISCONTINUED] naproxen (NAPROSYN) 500 mg tablet Take 1 tablet (500 mg total) by mouth 2 (two) times a day with meals (Patient not taking: Reported on 7/11/2023)    [DISCONTINUED] omeprazole (PriLOSEC) 40 MG capsule Take 1 capsule (40 mg total) by mouth daily as needed (indigestion) (Patient not taking: Reported on 7/11/2023)    [DISCONTINUED] ondansetron (ZOFRAN) 4 mg tablet Take 1 tablet (4 mg total) by mouth every 8 (eight) hours as needed for nausea or vomiting (Patient not taking: Reported on 7/11/2023)    [DISCONTINUED] sertraline (ZOLOFT) 50 mg tablet  (Patient not taking: Reported on 10/13/2023)       Objective     /74 (BP Location: Right arm, Patient Position: Sitting, Cuff Size: Standard)   Pulse 87   Temp (!) 96.9 °F (36.1 °C)   Ht 5' 4" (1.626 m)   Wt 67.6 kg (149 lb)   SpO2 97%   BMI 25.58 kg/m²     Physical Exam  Vitals reviewed. Constitutional:       General: She is not in acute distress. Appearance: She is not ill-appearing.    Cardiovascular:      Rate and Rhythm: Normal rate and regular rhythm. Pulmonary:      Effort: Pulmonary effort is normal. No respiratory distress. Breath sounds: Normal breath sounds. No wheezing, rhonchi or rales. Musculoskeletal:      Cervical back: Normal range of motion. Lymphadenopathy:      Cervical: No cervical adenopathy. Skin:     General: Skin is warm and dry. Coloration: Skin is not jaundiced or pale. Neurological:      General: No focal deficit present. Mental Status: She is alert and oriented to person, place, and time. Psychiatric:         Mood and Affect: Mood normal.         Behavior: Behavior normal.         Thought Content:  Thought content normal.         Judgment: Judgment normal.       Lyndsey Gamez

## 2023-10-13 NOTE — PATIENT INSTRUCTIONS
You have been diagnosed with bronchitis. Bronchitis is a viral illness and antibiotics are not indicated but will check chest xray as discussed to assure not pneumonia. Cough medication as prescribed. Rescue inhaler 2 puffs every 4-6 hours as needed for shortness breath, chest tightness, bronchospasm, coughing fits. Prednisone has been prescribed for inflammation in the airways. Can continue to use tessalon perles as needed.

## 2023-10-17 ENCOUNTER — TELEPHONE (OUTPATIENT)
Age: 58
End: 2023-10-17

## 2023-10-17 NOTE — TELEPHONE ENCOUNTER
Patient would like a call back from the office regarding her xray results from Friday 10/13/23. She would like someone to call her back as soon as possible with results.

## 2023-10-17 NOTE — TELEPHONE ENCOUNTER
I called pt and advised pt that it can take up to 7 days for a read on X-Ray. I called the reading room and asked if they can read results. . I did advised the pt that unfortunately it is not guaranteed that it will get done soon, but I can try.

## 2023-10-19 ENCOUNTER — TELEPHONE (OUTPATIENT)
Dept: FAMILY MEDICINE CLINIC | Facility: CLINIC | Age: 58
End: 2023-10-19

## 2023-10-19 NOTE — TELEPHONE ENCOUNTER
Spoke with pt she is aware this is normal for viral bronchitis. Advised that if she does not get better to call for an evaluation.

## 2023-10-19 NOTE — TELEPHONE ENCOUNTER
Please triage. She had viral bronchitis so symptoms can linger. Cxr shows nothing acute, no pneumonia. Exam was normal when seen last week.    Symptomatic is usually is what is needed only- fluids, rest, otc tylenol/motrin, cough meds, etc.

## 2023-10-19 NOTE — TELEPHONE ENCOUNTER
----- Message from [address] [facility] [facility] sent at 10/19/2023 12:01 PM EDT -----  Please call pt and advise that cxr was normal.

## 2023-10-19 NOTE — TELEPHONE ENCOUNTER
Pt called back, understood results. Pt still isnt feeling the best.  She said she's very fatigue, nauseas and has a headache.   Please advise

## 2023-12-06 DIAGNOSIS — G43.911 INTRACTABLE MIGRAINE WITH STATUS MIGRAINOSUS, UNSPECIFIED MIGRAINE TYPE: ICD-10-CM

## 2023-12-06 NOTE — TELEPHONE ENCOUNTER
Pt also says she was put on Prilosec for esophagus issues. She's also requesting that if it can be put in through us or if she needs to go to the pharmacy to get that.

## 2023-12-07 NOTE — TELEPHONE ENCOUNTER
Fioricet was ordered by neuro so pt needs to contact Dr. Wilber Meza if to continue. Regarding Prilosec, was having GI issues, had scope and was told in 2021 (Dr. Ralf Glover), to continue omeprazole only as needed "for now" so if having issues, can take otc or contact GI to discuss.

## 2023-12-08 RX ORDER — BUTALBITAL, ACETAMINOPHEN AND CAFFEINE 50; 325; 40 MG/1; MG/1; MG/1
1 TABLET ORAL ONCE AS NEEDED
Qty: 15 TABLET | Refills: 0 | Status: SHIPPED | OUTPATIENT
Start: 2023-12-08

## 2024-01-15 ENCOUNTER — TELEPHONE (OUTPATIENT)
Dept: NEUROLOGY | Facility: CLINIC | Age: 59
End: 2024-01-15

## 2024-01-15 DIAGNOSIS — G43.109 MIGRAINE WITH AURA AND WITHOUT STATUS MIGRAINOSUS, NOT INTRACTABLE: Primary | ICD-10-CM

## 2024-01-15 NOTE — TELEPHONE ENCOUNTER
Recd vm  5:19 PM    pharmacist from [facility] pharmacy, calling for one of the mutual patient Hannah Downing,  [birth year]. The doctor just sent me the prescription for aimovig, 140 milligrams But for the new year, the insurance is not covering that particular one and they are preferring emgality or ajovy. So if you can send me the new prescription. If you have any question, give me a call back on [phone].

## 2024-01-22 NOTE — TELEPHONE ENCOUNTER
Called [phone] to check status. Spoke to Yuliya and states that PA is not required for Emgality. This med was dispensed on 1/15/24.     Per CMM, PA not required. Letter scanned in media

## 2024-02-19 DIAGNOSIS — G43.911 INTRACTABLE MIGRAINE WITH STATUS MIGRAINOSUS, UNSPECIFIED MIGRAINE TYPE: ICD-10-CM

## 2024-02-20 RX ORDER — BUTALBITAL, ACETAMINOPHEN AND CAFFEINE 50; 325; 40 MG/1; MG/1; MG/1
1 TABLET ORAL ONCE AS NEEDED
Qty: 15 TABLET | Refills: 0 | Status: SHIPPED | OUTPATIENT
Start: 2024-02-20

## 2024-03-11 DIAGNOSIS — F41.9 ANXIETY: ICD-10-CM

## 2024-03-11 RX ORDER — ALPRAZOLAM 0.25 MG/1
TABLET ORAL
Qty: 30 TABLET | Refills: 0 | Status: CANCELLED | OUTPATIENT
Start: 2024-03-11

## 2024-03-11 NOTE — TELEPHONE ENCOUNTER
Reason for call:   [x] Refill   [] Prior Auth  [] Other:     Office:   [x] PCP/Provider -   [] Specialty/Provider -     Medication: xanax 0.25 mg, one tab daily PRN, 30 tabs    Pharmacy: [facility]     Does the patient have enough for 3 days?   [] Yes   [x] No - Send as HP to POD

## 2024-03-12 ENCOUNTER — OFFICE VISIT (OUTPATIENT)
Dept: FAMILY MEDICINE CLINIC | Facility: CLINIC | Age: 59
End: 2024-03-12
Payer: COMMERCIAL

## 2024-03-12 VITALS
WEIGHT: 152 LBS | TEMPERATURE: 98.3 F | RESPIRATION RATE: 16 BRPM | DIASTOLIC BLOOD PRESSURE: 70 MMHG | HEIGHT: 64 IN | HEART RATE: 101 BPM | SYSTOLIC BLOOD PRESSURE: 114 MMHG | OXYGEN SATURATION: 95 % | BODY MASS INDEX: 25.95 KG/M2

## 2024-03-12 DIAGNOSIS — F41.9 ANXIETY: Primary | ICD-10-CM

## 2024-03-12 PROCEDURE — 99213 OFFICE O/P EST LOW 20 MIN: CPT | Performed by: NURSE PRACTITIONER

## 2024-03-12 RX ORDER — ALPRAZOLAM 0.25 MG/1
TABLET ORAL
Qty: 60 TABLET | Refills: 0 | Status: SHIPPED | OUTPATIENT
Start: 2024-03-12

## 2024-03-12 NOTE — PROGRESS NOTES
Name: Hannah Downing      : 1965      MRN: [id number]  Encounter Provider: HELENA Viera  Encounter Date: 3/12/2024   Encounter department: [facility]    Assessment & Plan   1. Anxiety  Stress management. Activities to divert attention when possible.   Conscious breathing techniques as discussed.   Coping mechanisms and strategies vary from person to person so try to utilize strategies that you think may work for you (such as meditation, music, etc. ).  Consider counseling   Anti anxiety/depression medications as prescribed.   - ALPRAZolam (XANAX) 0.25 mg tablet; 1/2 to 1 tablet daily as needed  Dispense: 60 tablet; Refill: 0         Depression Screening Follow-up Plan: Patient's depression screening was positive  . Their PHQ-9 score was 6. Patient assessed for underlying major depression. They have no active suicidal ideations. Brief counseling provided and recommend additional follow-up/re-evaluation next office visit.    Subjective      Here for follow up on anxiety  On sertraline 25mg daily. Last filled by gyn. Recently started on progesterone.   Reacts to situational stress. Manageable. Takes xanax only as needed.   Having hard time currently with 16 year old daughter which is causing increased stress/anxiety  Panic attacks at times.               Review of Systems   Constitutional:  Negative for unexpected weight change.   Allergic/Immunologic: Negative for immunocompromised state.   Psychiatric/Behavioral:  Positive for dysphoric mood. Negative for self-injury and suicidal ideas. The patient is nervous/anxious.        Current Outpatient Medications on File Prior to Visit   Medication Sig    ALPRAZolam (XANAX) 0.25 mg tablet 1/2 to 1 tablet daily as needed    butalbital-acetaminophen-caffeine (FIORICET,ESGIC) -40 mg per tablet TAKE 1 TABLET BY MOUTH ONCE AS NEEDED FOR HEADACHES OR MIGRAINE FOR UP TO 15 DOSES    Galcanezumab-gnlm 120 MG/ML SOAJ First time,  "administer 2 injections 2-3 hours apart and then 1 injection monthly.    Progesterone 100 MG CAPS     Repatha SureClick 140 MG/ML SOAJ every 14 (fourteen) days    sertraline (ZOLOFT) 25 mg tablet TAKE 1 TABLET (25 MG TOTAL) BY MOUTH DAILY    albuterol (Ventolin HFA) 90 mcg/act inhaler Inhale 2 puffs every 6 (six) hours as needed for wheezing (Patient not taking: Reported on 3/12/2024)       Objective     /70 (BP Location: Right arm, Patient Position: Sitting, Cuff Size: Standard)   Pulse 101   Temp 98.3 °F (36.8 °C)   Resp 16   Ht 5' 4\" (1.626 m)   Wt 68.9 kg (152 lb)   SpO2 95%   BMI 26.09 kg/m²     Physical Exam  Vitals reviewed.   Cardiovascular:      Rate and Rhythm: Normal rate.   Pulmonary:      Effort: Pulmonary effort is normal.   Neurological:      Mental Status: She is alert and oriented to person, place, and time.   Psychiatric:         Mood and Affect: Mood normal.         Behavior: Behavior normal.         Thought Content: Thought content normal.         Judgment: Judgment normal.      Comments: Well groomed. Dressed appropriately for the weather.   Calm. Pleasant . Cooperative.   Good eye contact.   Converses freely and appropriately.            HELENA Viera    "

## 2024-03-22 ENCOUNTER — TELEPHONE (OUTPATIENT)
Dept: NEUROLOGY | Facility: CLINIC | Age: 59
End: 2024-03-22

## 2024-03-22 NOTE — TELEPHONE ENCOUNTER
Patient called wants other option to treat for migraine besides Emgality. It is making her sick. She gets dizzy, nauseous and disoriented. This is happening for awhile. She has new insurance that can give her other option rather than the Emgality. Please advise.

## 2024-03-26 NOTE — TELEPHONE ENCOUNTER
Katty Bonilla MD   to Me       3/26/24  1:46 PM  Ask if she wants to try Nurtec and if covered by her insurance.

## 2024-03-26 NOTE — TELEPHONE ENCOUNTER
Called and advised pt of the below. She verbalized understanding. Pt wants to hold off on Nurtec. Pt states that she tried so many meds. She would like to try botox. She wants to explore this option. She wants to start the process.   Pt states that she saw MK before. She is agreeable to go to [facility] office to see MK for botox. Advised that I will have to send this message to Dr Bonilla, UYEN and botox coordinator. She verbalized understanding.   Cb [phone], ok to leave detailed message

## 2024-03-29 ENCOUNTER — TELEPHONE (OUTPATIENT)
Dept: NEUROLOGY | Facility: CLINIC | Age: 59
End: 2024-03-29

## 2024-03-29 NOTE — TELEPHONE ENCOUNTER
Submitted auth request via [email or web address] with CMM. Key: [id number]   Will update with determination when available, thank you

## 2024-03-29 NOTE — TELEPHONE ENCOUNTER
Received fax with the following approval details:    Approved     Botox 200 UNITS    Qty. 1    Auth# MEMBID: [id number]     Valid:3/29/2024-9/29/2024    Visits: 2     Please use [facility].    Called [employer] [facility] and spoke with Marino benitez who created pt's chart and then transferred call to Tello, pharmacist. VO given. Will update with determination when available, thank you

## 2024-04-03 DIAGNOSIS — G43.911 INTRACTABLE MIGRAINE WITH STATUS MIGRAINOSUS, UNSPECIFIED MIGRAINE TYPE: ICD-10-CM

## 2024-04-03 RX ORDER — BUTALBITAL, ACETAMINOPHEN AND CAFFEINE 50; 325; 40 MG/1; MG/1; MG/1
1 TABLET ORAL ONCE AS NEEDED
Qty: 15 TABLET | Refills: 0 | Status: SHIPPED | OUTPATIENT
Start: 2024-04-03

## 2024-04-08 ENCOUNTER — TELEPHONE (OUTPATIENT)
Dept: NEUROLOGY | Facility: CLINIC | Age: 59
End: 2024-04-08

## 2024-04-08 NOTE — TELEPHONE ENCOUNTER
Called [employer] [facility], spoke to rep, Minda, who informed that botox is ready to ship but there is not consent on file from pt. Minda called pt but was sent straight to .     Called pt, no answer, lvm.    Will update with delivery once pt gives consent to pharmacy to ship, thank you

## 2024-04-08 NOTE — TELEPHONE ENCOUNTER
Patient called in as they had someone call them about trying to get patient scheduled with an AP in [place].    Upon checking patients insurance patient has to be seen in [place].    Patient last saw Dr Bonilla on 7/11/23. Notes from that appointment do not have a follow up date or notes.    Patient requested a soon appointment that was offered for Subhash in [place] in 4/12/24

## 2024-04-08 NOTE — TELEPHONE ENCOUNTER
I phoned Patient to see the reason for the scheduled appointment on 04/12. Patient stated that she wanted to discuss migraine treatment. She stated that we were to see if she would be approved for Botox but she ahd not heard anything. I did review pts chart and it does state that as of 03/29 the Botox was approved but waiting pts approval. I did inform the Patient and also stated that she will be seeing  NP and she does not do botox appts.     Patient was curious as to how the botox was injected so I informed her of the injection sites and she stated she did not want any in her face. I did inform her that in order for the botox to be effective it would have to be injections into the appropriate areas.   Patient stated that she wanted information about obtaining her medical records to go to another neurologist. I informed her that I would call her back with the information. I did call the Patient back and left a detailed message asking for her to call me back so I can verify her email address to forward her the medical records release form for her to fill out and send back so we can send it to them for completion.

## 2024-04-10 ENCOUNTER — TELEPHONE (OUTPATIENT)
Dept: NEUROLOGY | Facility: CLINIC | Age: 59
End: 2024-04-10

## 2024-04-10 NOTE — TELEPHONE ENCOUNTER
I spoke to Patient and explained the process of requesting her entire medical records. I did email the blank form for her to fill out and bring back to the office to be forward to [facility]. I also provided Patient with the contact number for her to call the records dept to ask them the cost, process and turn around time.

## 2024-08-05 ENCOUNTER — HOSPITAL ENCOUNTER (EMERGENCY)
Facility: HOSPITAL | Age: 59
Discharge: HOME | End: 2024-08-05
Attending: EMERGENCY MEDICINE
Payer: COMMERCIAL

## 2024-08-05 ENCOUNTER — APPOINTMENT (EMERGENCY)
Dept: CT IMAGING | Facility: HOSPITAL | Age: 59
End: 2024-08-05
Payer: COMMERCIAL

## 2024-08-05 VITALS
RESPIRATION RATE: 17 BRPM | OXYGEN SATURATION: 99 % | DIASTOLIC BLOOD PRESSURE: 87 MMHG | TEMPERATURE: 98.2 F | SYSTOLIC BLOOD PRESSURE: 122 MMHG | HEART RATE: 83 BPM

## 2024-08-05 DIAGNOSIS — S06.0X0A CONCUSSION WITHOUT LOSS OF CONSCIOUSNESS, INITIAL ENCOUNTER: Primary | ICD-10-CM

## 2024-08-05 PROCEDURE — 70450 CT HEAD/BRAIN W/O DYE: CPT

## 2024-08-05 PROCEDURE — 99284 EMERGENCY DEPT VISIT MOD MDM: CPT

## 2024-08-05 PROCEDURE — 99284 EMERGENCY DEPT VISIT MOD MDM: CPT | Performed by: EMERGENCY MEDICINE
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
## 2024-08-05 NOTE — DISCHARGE INSTRUCTIONS
Please return to the ER if you develop continued vomiting that prevents you from keeping down food or water, worst headache of your life, arm or leg weakness, are unable to walk or stand, or any other concerning symptoms

## 2025-01-21 ENCOUNTER — OFFICE VISIT (OUTPATIENT)
Dept: URGENT CARE | Facility: CLINIC | Age: 60
End: 2025-01-21
Payer: COMMERCIAL

## 2025-01-21 VITALS
WEIGHT: 163 LBS | SYSTOLIC BLOOD PRESSURE: 125 MMHG | HEART RATE: 96 BPM | OXYGEN SATURATION: 95 % | DIASTOLIC BLOOD PRESSURE: 85 MMHG | HEIGHT: 64 IN | BODY MASS INDEX: 27.83 KG/M2 | RESPIRATION RATE: 18 BRPM | TEMPERATURE: 98.2 F

## 2025-01-21 DIAGNOSIS — R68.89 FLU-LIKE SYMPTOMS: Primary | ICD-10-CM

## 2025-01-21 PROCEDURE — 99213 OFFICE O/P EST LOW 20 MIN: CPT | Performed by: FAMILY MEDICINE

## 2025-01-21 PROCEDURE — 87636 SARSCOV2 & INF A&B AMP PRB: CPT | Performed by: FAMILY MEDICINE

## 2025-01-21 RX ORDER — RIMEGEPANT SULFATE 75 MG/75MG
TABLET, ORALLY DISINTEGRATING ORAL
COMMUNITY
Start: 2024-09-05

## 2025-01-21 RX ORDER — BROMPHENIRAMINE MALEATE, PSEUDOEPHEDRINE HYDROCHLORIDE, AND DEXTROMETHORPHAN HYDROBROMIDE 2; 30; 10 MG/5ML; MG/5ML; MG/5ML
5 SYRUP ORAL 4 TIMES DAILY PRN
Qty: 120 ML | Refills: 0 | Status: SHIPPED | OUTPATIENT
Start: 2025-01-21

## 2025-01-21 NOTE — PROGRESS NOTES
[facility]        NAME: Hannah Downing is a 59 y.o. female  : 1965    MRN: [id number]  DATE: 2025  TIME: 12:24 PM    Assessment and Plan   Flu-like symptoms [R68.89]  1. Flu-like symptoms  Covid/Flu- Office Collect Normal    brompheniramine-pseudoephedrine-DM 30-2-10 MG/5ML syrup        COVID and flu PCR pending.  Cough suppressant prescribed for symptom relief.  Supportive measures encouraged.    Outside the window for Tamiflu.  Discussed treatment options for COVID should she be positive.  Patient prefers to take azithromycin.    Patient Instructions     Follow up with PCP in 3-5 days.  Proceed to  ER if symptoms worsen.    If tests have been performed at [facility], our office will contact you with results if changes need to be made to the care plan discussed with you at the visit.  [facility]    Chief Complaint     Chief Complaint   Patient presents with    Flu Symptoms     Cough congestion fatigue body aches. OTC - mucinex tylenol         History of Present Illness       59-year-old female presents today with flulike symptoms which started about 3 to 4 days ago.  Started with coughing which progressed to generalized myalgias and fatigue.  Is also been asked experiencing nasal congestion, postnasal drip and most recently abdominal discomfort with anorexia.  Denies any obvious sick contacts outside of her daughter who is also experiencing similar symptoms.    Flu Symptoms  Associated symptoms include congestion, headaches, fatigue, coughing and abdominal pain. Pertinent negatives include no fever, chest pain, shortness of breath or nausea.       Review of Systems   Review of Systems   Constitutional:  Positive for appetite change and fatigue. Negative for chills and fever.   HENT:  Positive for congestion and postnasal drip.    Respiratory:  Positive for cough. Negative for shortness of breath.    Cardiovascular:  Negative for chest pain.    Gastrointestinal:  Positive for abdominal pain. Negative for nausea.   Musculoskeletal:  Positive for myalgias.   Neurological:  Positive for weakness and headaches. Negative for dizziness.         Current Medications       Current Outpatient Medications:     albuterol (Ventolin HFA) 90 mcg/act inhaler, Inhale 2 puffs every 6 (six) hours as needed for wheezing, Disp: 18 g, Rfl: 1    ALPRAZolam (XANAX) 0.25 mg tablet, 1/2 to 1 tablet daily as needed, Disp: 60 tablet, Rfl: 0    brompheniramine-pseudoephedrine-DM 30-2-10 MG/5ML syrup, Take 5 mL by mouth 4 (four) times a day as needed for congestion or cough, Disp: 120 mL, Rfl: 0    Galcanezumab-gnlm 120 MG/ML SOAJ, First time, administer 2 injections 2-3 hours apart and then 1 injection monthly., Disp: 2 mL, Rfl: 3    Nurtec 75 MG TBDP, 1 tablet on the tongue and allow to dissolve Orally Once a day as needed for 30 days, Disp: , Rfl:     Repatha SureClick 140 MG/ML SOAJ, every 14 (fourteen) days, Disp: , Rfl:     butalbital-acetaminophen-caffeine (FIORICET,ESGIC) -40 mg per tablet, TAKE 1 TABLET BY MOUTH ONCE AS NEEDED FOR HEADACHES OR MIGRAINE FOR UP TO 15 DOSES (Patient not taking: Reported on 1/21/2025), Disp: 15 tablet, Rfl: 0    Progesterone 100 MG CAPS, , Disp: , Rfl:     sertraline (ZOLOFT) 25 mg tablet, TAKE 1 TABLET (25 MG TOTAL) BY MOUTH DAILY (Patient not taking: Reported on 1/21/2025), Disp: 90 tablet, Rfl: 0    Current Allergies     Allergies as of 01/21/2025 - Reviewed 01/21/2025   Allergen Reaction Noted    Penicillins Hives 02/19/2017            The following portions of the patient's history were reviewed and updated as appropriate: allergies, current medications, past family history, past medical history, past social history, past surgical history and problem list.     Past Medical History:   Diagnosis Date    Abdominal pain     LLQ    Anxiety     CAD (coronary artery disease)     slight blockage    Contact lens/glasses fitting      "Diverticulitis 8/10/2021    Diverticulosis     LLQ abdominal pain 8/10/2021    Migraine     Migraines        Past Surgical History:   Procedure Laterality Date    BREAST SURGERY Right     lumpectomy - benign    COLONOSCOPY      ECTOPIC PREGNANCY SURGERY      KNEE SURGERY Right        Family History   Problem Relation Age of Onset    No Known Problems Mother     Heart disease Father     Substance Abuse Neg Hx     Mental illness Neg Hx          Medications have been verified.        Objective   /85   Pulse 96   Temp 98.2 °F (36.8 °C)   Resp 18   Ht 5' 4\" (1.626 m)   Wt 73.9 kg (163 lb)   SpO2 95%   BMI 27.98 kg/m²   No LMP recorded. Patient is postmenopausal.       Physical Exam     Physical Exam  Vitals and nursing note reviewed.   Constitutional:       General: She is in acute distress.      Appearance: Normal appearance. She is normal weight. She is not ill-appearing, toxic-appearing or diaphoretic.   HENT:      Head: Normocephalic and atraumatic.      Nose:      Comments: Inflamed nasal mucosa  Eyes:      General:         Right eye: No discharge.         Left eye: No discharge.      Conjunctiva/sclera: Conjunctivae normal.   Cardiovascular:      Rate and Rhythm: Normal rate and regular rhythm.   Pulmonary:      Effort: Pulmonary effort is normal. No respiratory distress.      Breath sounds: Normal breath sounds. No wheezing, rhonchi or rales.   Skin:     General: Skin is warm.      Findings: No erythema.   Neurological:      General: No focal deficit present.      Mental Status: She is alert and oriented to person, place, and time.   Psychiatric:         Mood and Affect: Mood normal.         Behavior: Behavior normal.         Thought Content: Thought content normal.         Judgment: Judgment normal.                   "

## 2025-01-22 ENCOUNTER — RESULTS FOLLOW-UP (OUTPATIENT)
Dept: URGENT CARE | Facility: CLINIC | Age: 60
End: 2025-01-22

## 2025-01-22 LAB
FLUAV RNA RESP QL NAA+PROBE: POSITIVE
FLUBV RNA RESP QL NAA+PROBE: NEGATIVE
SARS-COV-2 RNA RESP QL NAA+PROBE: NEGATIVE

## 2025-01-22 NOTE — TELEPHONE ENCOUNTER
Called to update on positive Flu A results. Patient reports that she is feeling significantly better today. All questions answered to patient's satisfaction.

## 2025-02-10 ENCOUNTER — APPOINTMENT (OUTPATIENT)
Dept: RADIOLOGY | Facility: CLINIC | Age: 60
End: 2025-02-10

## 2025-02-10 ENCOUNTER — OCCMED (OUTPATIENT)
Dept: URGENT CARE | Facility: CLINIC | Age: 60
End: 2025-02-10
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
## 2025-02-10 PROCEDURE — 99213 OFFICE O/P EST LOW 20 MIN: CPT | Performed by: PHYSICIAN ASSISTANT

## 2025-02-10 PROCEDURE — 73564 X-RAY EXAM KNEE 4 OR MORE: CPT

## 2025-04-04 ENCOUNTER — TELEPHONE (OUTPATIENT)
Age: 60
End: 2025-04-04

## 2025-04-04 NOTE — TELEPHONE ENCOUNTER
Lab results faxed to [facility] 4/4.  Fax confirmed sent. A  Fax number was taken down incorrectly...  its [phone]

## 2025-04-04 NOTE — TELEPHONE ENCOUNTER
Nita calling from Dr. Andrew Minaya's office, Cardiology, for the most recent labs on patient.  Last labs show from 9/21/2022.  Nita stated that those are acceptable.    Please fax to [phone]    Please note once faxed.  Thank you !

## 2025-05-28 ENCOUNTER — OCCMED (OUTPATIENT)
Dept: URGENT CARE | Facility: CLINIC | Age: 60
End: 2025-05-28
Payer: COMMERCIAL

## 2025-05-28 DIAGNOSIS — S89.92XS LEFT KNEE INJURY, SEQUELA: Primary | ICD-10-CM

## 2025-05-28 PROCEDURE — 99213 OFFICE O/P EST LOW 20 MIN: CPT | Performed by: FAMILY MEDICINE

## 2025-06-10 ENCOUNTER — TELEPHONE (OUTPATIENT)
Dept: OBGYN CLINIC | Facility: CLINIC | Age: 60
End: 2025-06-10

## 2025-06-10 ENCOUNTER — OFFICE VISIT (OUTPATIENT)
Dept: OBGYN CLINIC | Facility: CLINIC | Age: 60
End: 2025-06-10
Payer: OTHER MISCELLANEOUS

## 2025-06-10 VITALS — WEIGHT: 163 LBS | BODY MASS INDEX: 27.83 KG/M2 | HEIGHT: 64 IN

## 2025-06-10 DIAGNOSIS — M23.92 INTERNAL DERANGEMENT OF LEFT KNEE: Primary | ICD-10-CM

## 2025-06-10 DIAGNOSIS — M25.862 PATELLOFEMORAL DYSFUNCTION OF LEFT KNEE: ICD-10-CM

## 2025-06-10 PROCEDURE — 99213 OFFICE O/P EST LOW 20 MIN: CPT | Performed by: ORTHOPAEDIC SURGERY

## 2025-06-10 RX ORDER — CYCLOBENZAPRINE HCL 5 MG
5 TABLET ORAL
COMMUNITY
Start: 2025-04-10

## 2025-06-10 RX ORDER — LORAZEPAM 0.5 MG/1
TABLET ORAL
COMMUNITY
Start: 2025-05-30

## 2025-06-10 NOTE — ASSESSMENT & PLAN NOTE
Patient placed in hinged knee brace today for stability.  MRI of the  Left knee ordered today for reevaluation and further characterization of her pathology  Follow-up after MRI to review results, consider physical therapy at that time  Orders:  •  MRI knee left wo contrast; Future  •  Brace

## 2025-06-10 NOTE — PROGRESS NOTES
Patient Name: Hannah Downing      : 1965       MRN: [id number]   Encounter Provider: Todd Yañez MD   Encounter Date: 25  Encounter department: [facility]         Assessment & Plan  Internal derangement of left knee  Patellofemoral dysfunction of left knee  Patient placed in hinged knee brace today for stability.  MRI of the  Left knee ordered today for reevaluation and further characterization of her pathology  Follow-up after MRI to review results, consider physical therapy at that time  Orders:  •  MRI knee left wo contrast; Future  •  Brace       Plan:  Hannah is a very pleasant 60-year-old female who presents today for evaluation and treatment of left knee pain. Diagnostics reviewed and physical exam performed.  Diagnosis, treatment options and associated risks were discussed with the patient including no treatment, nonsurgical treatment and potential for surgical intervention.  The patient was given the opportunity to ask questions regarding each.  Discussed that her physical exam is most consistent with a possible internal derangement of her left knee, therefore MRI was ordered today to further characterize her symptomology and evaluate for any pathology that may have developed since her last MRI 3 months ago given the change in her pain location and aggravating factors.  She was placed in a hinged knee brace today due to sensation of instability at times.  Follow-up after obtaining MRI left knee to review results and further delineate appropriate treatment follow-up.  Consider initiating physical therapy at that time.    Follow-up:  Return for after MRI.     _____________________________________________________  CHIEF COMPLAINT:  Chief Complaint   Patient presents with   • Left Knee - Pain         SUBJECTIVE:  Hannah Downing is a 60 y.o. female who presents for evaluation and treatment of left knee pain. She states she fell in January in the parking lot  "at her work. She had knee, elbow, wrist injury and also a concussion. She has been treating with with Occupational Medicine. Her knee felt like it was getting better over time but with any weight bearing activity pain became severe. She received an MRI which demonstrated bursitis, which has since resolved. As she increased her activity level with warmer weather her pain became more severe and constant.  She states her joint feels loose when she walks. Her pain is anterior and medial. She denies mechanical symptoms. She states pain at times radiates proximally to the mid-thigh level. She feels pain with use of her knee. She denies any previous injury, surgery, or trauma to her left knee. She has used an ACE bandage with no relief. She has not received any previous injections. She has not taken any oral pain medication because she does not want to mask her pain. She has not completed any Physical Therapy which she believes would not be helpful. Her Occupational Medicine physician suspected a possible meniscus tear and referred her for further evaluation.       PAST MEDICAL HISTORY:  Past Medical History[1]    PAST SURGICAL HISTORY:  Past Surgical History[2]    FAMILY HISTORY:  Family History[3]    SOCIAL HISTORY:  Social History[4]    MEDICATIONS:  Current Medications[5]    ALLERGIES:  Allergies[6]    LABS:  HgA1c:   Lab Results   Component Value Date    HGBA1C 5.5 04/24/2021     BMP:   Lab Results   Component Value Date    K 4.5 09/21/2022    CO2 23 09/21/2022     09/21/2022    BUN 14 09/21/2022    CREATININE 0.59 09/21/2022     CBC: No components found for: \"CBC\"    _____________________________________________________  Review of Systems   Constitutional:  Negative for chills and fever.   HENT:  Negative for ear pain and sore throat.    Eyes:  Negative for pain and visual disturbance.   Respiratory:  Negative for cough and shortness of breath.    Cardiovascular:  Negative for chest pain and palpitations. "   Gastrointestinal:  Negative for abdominal pain and vomiting.   Genitourinary:  Negative for dysuria and hematuria.   Musculoskeletal:  Positive for arthralgias. Negative for back pain.   Skin:  Negative for color change and rash.   Neurological:  Negative for seizures and syncope.   All other systems reviewed and are negative.       Right Knee Exam     Tenderness   The patient is experiencing tenderness in the medial joint line, patella and patellar tendon.    Range of Motion   The patient has normal right knee ROM.    Tests   Nanci:  Medial - negative Lateral - negative  Varus: negative Valgus: positive    Other   Erythema: absent  Scars: absent  Sensation: normal  Pulse: present  Swelling: none  Effusion: no effusion present    Comments:    Knee stable at 0, 30, 90 degrees  + Patellar grind  + peripatellar crepitation  Skin intact and well perfused  Sensation intact in DP/SP/Alvarado/Sa/T nerve distributions  2+ DP & PT pulses  Brisk capillary refill in all toes               Physical Exam  Constitutional:       General: She is not in acute distress.     Appearance: Normal appearance.   HENT:      Head: Normocephalic and atraumatic.     Eyes:      General: No scleral icterus.     Extraocular Movements: Extraocular movements intact.      Conjunctiva/sclera: Conjunctivae normal.       Cardiovascular:      Pulses: Normal pulses.   Pulmonary:      Effort: Pulmonary effort is normal. No respiratory distress.     Musculoskeletal:      Cervical back: Normal range of motion and neck supple.      Right knee: No effusion.      Instability Tests: Medial Nanci test negative and lateral Nanci test negative.      Comments: See Ortho Exam     Skin:     General: Skin is warm and dry.     Neurological:      General: No focal deficit present.      Mental Status: She is alert and oriented to person, place, and time.        _____________________________________________________  STUDIES REVIEWED:  I personally reviewed the  pertinent images and my independent interpretation is as follows:  MRI report from 3/5/25 demonstrates no acute ligamentous, meniscal, cartilage, or osseou injury. Mild prepatellar edema. No drainable fluid collections.     PROCEDURES PERFORMED:   NO procedures performed today    Scribe Attestation    I,:  Stephanie Toscano am acting as a scribe while in the presence of the attending physician.:       I,:  Todd Yañez MD personally performed the services described in this documentation    as scribed in my presence.:                   [1]  Past Medical History:  Diagnosis Date   • Abdominal pain     LLQ   • Anxiety    • CAD (coronary artery disease)     slight blockage   • Contact lens/glasses fitting    • Diverticulitis 8/10/2021   • Diverticulosis    • LLQ abdominal pain 8/10/2021   • Migraine    • Migraines    [2]  Past Surgical History:  Procedure Laterality Date   • BREAST SURGERY Right     lumpectomy - benign   • COLONOSCOPY     • ECTOPIC PREGNANCY SURGERY     • KNEE SURGERY Right    [3]  Family History  Problem Relation Name Age of Onset   • No Known Problems Mother     • Heart disease Father     • Substance Abuse Neg Hx     • Mental illness Neg Hx     [4]  Social History  Tobacco Use   • Smoking status: Never   • Smokeless tobacco: Never   Vaping Use   • Vaping status: Never Used   Substance Use Topics   • Alcohol use: Yes     Comment: socially   • Drug use: No   [5]    Current Outpatient Medications:   •  cyclobenzaprine (FLEXERIL) 5 mg tablet, Take 5 mg by mouth, Disp: , Rfl:   •  Nurtec 75 MG TBDP, , Disp: , Rfl:   •  Repatha SureClick 140 MG/ML SOAJ, every 14 (fourteen) days, Disp: , Rfl:   •  albuterol (Ventolin HFA) 90 mcg/act inhaler, Inhale 2 puffs every 6 (six) hours as needed for wheezing (Patient not taking: Reported on 6/10/2025), Disp: 18 g, Rfl: 1  •  ALPRAZolam (XANAX) 0.25 mg tablet, 1/2 to 1 tablet daily as needed (Patient not taking: Reported on 6/10/2025), Disp: 60 tablet, Rfl: 0  •   brompheniramine-pseudoephedrine-DM 30-2-10 MG/5ML syrup, Take 5 mL by mouth 4 (four) times a day as needed for congestion or cough (Patient not taking: Reported on 6/10/2025), Disp: 120 mL, Rfl: 0  •  butalbital-acetaminophen-caffeine (FIORICET,ESGIC) -40 mg per tablet, TAKE 1 TABLET BY MOUTH ONCE AS NEEDED FOR HEADACHES OR MIGRAINE FOR UP TO 15 DOSES (Patient not taking: Reported on 6/10/2025), Disp: 15 tablet, Rfl: 0  •  Galcanezumab-gnlm 120 MG/ML SOAJ, First time, administer 2 injections 2-3 hours apart and then 1 injection monthly. (Patient not taking: Reported on 6/10/2025), Disp: 2 mL, Rfl: 3  •  LORazepam (ATIVAN) 0.5 mg tablet, Take half a tablet to one tablet by mouth daily only as needed for anxiety or sleep (Patient not taking: Reported on 6/10/2025), Disp: , Rfl:   •  Progesterone 100 MG CAPS, , Disp: , Rfl:   •  sertraline (ZOLOFT) 25 mg tablet, TAKE 1 TABLET (25 MG TOTAL) BY MOUTH DAILY (Patient not taking: Reported on 6/10/2025), Disp: 90 tablet, Rfl: 0[6]  Allergies  Allergen Reactions   • Penicillins Hives

## 2025-06-28 ENCOUNTER — HOSPITAL ENCOUNTER (OUTPATIENT)
Dept: RADIOLOGY | Facility: HOSPITAL | Age: 60
Discharge: HOME | End: 2025-06-28
Attending: ORTHOPAEDIC SURGERY
Payer: COMMERCIAL

## 2025-06-28 DIAGNOSIS — M25.862 PATELLOFEMORAL DYSFUNCTION OF LEFT KNEE: ICD-10-CM

## 2025-06-28 DIAGNOSIS — M23.92 INTERNAL DERANGEMENT OF LEFT KNEE: ICD-10-CM

## 2025-06-28 PROCEDURE — 73721 MRI JNT OF LWR EXTRE W/O DYE: CPT

## 2025-07-08 ENCOUNTER — OFFICE VISIT (OUTPATIENT)
Age: 60
End: 2025-07-08
Payer: OTHER MISCELLANEOUS

## 2025-07-08 VITALS — BODY MASS INDEX: 27.83 KG/M2 | WEIGHT: 163 LBS | HEIGHT: 64 IN

## 2025-07-08 DIAGNOSIS — M25.862 PATELLOFEMORAL DYSFUNCTION OF LEFT KNEE: Primary | ICD-10-CM

## 2025-07-08 PROCEDURE — 99213 OFFICE O/P EST LOW 20 MIN: CPT | Performed by: ORTHOPAEDIC SURGERY

## 2025-07-08 NOTE — ASSESSMENT & PLAN NOTE
MRI reviewed  Referral to PT along with HEP provided   Hinge knee brace as needed  Voltaren sent to pharmacy   Orders:  •  Ambulatory Referral to Physical Therapy; Future  •  Diclofenac Sodium (VOLTAREN) 1 %; Apply 2 g topically 4 (four) times a day

## 2025-07-08 NOTE — PROGRESS NOTES
Patient Name: Hannah Downing      : 1965       MRN: [id number]   Encounter Provider: Todd Yañez MD   Encounter Date: 25  Encounter department: [facility] [place]         Assessment & Plan  Patellofemoral dysfunction of left knee  MRI reviewed  Referral to PT along with HEP provided   Hinge knee brace as needed  Voltaren sent to pharmacy   Orders:  •  Ambulatory Referral to Physical Therapy; Future  •  Diclofenac Sodium (VOLTAREN) 1 %; Apply 2 g topically 4 (four) times a day       Plan:  Hannah is a pleasant 60 y.o. who presents for follow up evaluation of left knee pain after an injury sustained at work in 2025. This is a workman's compensation case. Repeat MRI obtained 25 reviewed with patient which does not demonstrate any internal derangement. Discussed with patient that her symptoms are primarily patellofemoral and she would benefit from formal physical therapy which she has not yet attended. Recommend VMO and hip abductor strengthening. Patient provided HEP as well. Voltaren sent to pharmacy. She can continue with hinge knee brace as needed. Discussed OTC brace as she finds hinge knee uncomfortable. Continue activities as tolerated. Discussed corticosteroid injection, patient declined.  She may continue to work as tolerated.    Follow-up:  Return in about 6 weeks (around 2025).     _____________________________________________________  CHIEF COMPLAINT:  Chief Complaint   Patient presents with   • Left Knee - Follow-up         SUBJECTIVE:  Hannah Downing is a 60 y.o. female who presents for follow up evaluation of left knee. Patient fell in January while at work and her knee pain has not improved. Pain is localized anterior and medial, aggravated with activity. She feels unstable. She has tried an ace wrap and a hinge knee brace. She prefers not to take OTC analgesics. She has not completed any Physical Therapy which she believes would not be  "helpful.     PAST MEDICAL HISTORY:  Past Medical History[1]    PAST SURGICAL HISTORY:  Past Surgical History[2]    FAMILY HISTORY:  Family History[3]    SOCIAL HISTORY:  Social History[4]    MEDICATIONS:  Current Medications[5]    ALLERGIES:  Allergies[6]    LABS:  HgA1c:   Lab Results   Component Value Date    HGBA1C 5.5 04/24/2021     BMP:   Lab Results   Component Value Date    K 4.5 09/21/2022    CO2 23 09/21/2022     09/21/2022    BUN 14 09/21/2022    CREATININE 0.59 09/21/2022     CBC: No components found for: \"CBC\"    _____________________________________________________  Review of Systems   Constitutional:  Positive for activity change. Negative for chills and fever.   HENT:  Negative for ear pain and sore throat.    Eyes:  Negative for pain and visual disturbance.   Respiratory:  Negative for cough and shortness of breath.    Cardiovascular:  Negative for chest pain and palpitations.   Gastrointestinal:  Negative for abdominal pain and vomiting.   Genitourinary:  Negative for dysuria and hematuria.   Musculoskeletal:  Positive for arthralgias. Negative for back pain.   Skin:  Negative for color change and rash.   Neurological:  Negative for seizures and syncope.   All other systems reviewed and are negative.       Left Knee Exam     Tenderness   The patient is experiencing no tenderness.     Range of Motion   Extension:  0   Flexion:  120     Tests   Nanci:  Medial - negative Lateral - negative  Varus: negative Valgus: negative  Drawer:  Anterior - negative     Posterior - negative    Other   Erythema: absent  Sensation: normal  Pulse: present  Swelling: none  Effusion: no effusion present    Comments:  TTP Hoffa's fat pad and suprapatellar fat pad   Skin is warm and dry to touch with no signs of erythema, ecchymosis, or infection   Flexor and extensor mechanisms are intact   Knee is stable to varus and valgus stress  Calf compartments are soft and supple  2+ DP and PT pulses with brisk capillary " refill to the toes  Sural, saphenous, tibial, superficial, and deep peroneal motor and sensory distributions intact  Sensation light touch intact distally               Physical Exam  Vitals and nursing note reviewed.   Constitutional:       Appearance: Normal appearance.   HENT:      Head: Normocephalic.      Right Ear: External ear normal.      Left Ear: External ear normal.     Eyes:      Extraocular Movements: Extraocular movements intact.      Conjunctiva/sclera: Conjunctivae normal.       Cardiovascular:      Rate and Rhythm: Normal rate.      Pulses: Normal pulses.   Pulmonary:      Effort: Pulmonary effort is normal.      Breath sounds: Normal breath sounds.   Abdominal:      General: Abdomen is flat.     Musculoskeletal:         General: Normal range of motion.      Cervical back: Normal range of motion and neck supple.      Left knee: No effusion.      Instability Tests: Medial Nanci test negative and lateral Nanci test negative.      Comments: See ortho exam     Skin:     General: Skin is warm and dry.     Neurological:      General: No focal deficit present.      Mental Status: She is alert.     Psychiatric:         Mood and Affect: Mood normal.         Behavior: Behavior normal.        _____________________________________________________  STUDIES REVIEWED:  I personally reviewed the pertinent images and my independent interpretation is as follows:  MRI of left knee obtained 6/28/25 unremarkable   MRI of left knee obtained 3/5/25 demonstrates mild prepatellar edema.    PROCEDURES PERFORMED:  No procedures performed today.    Scribe Attestation    I,:  Skye Escobar am acting as a scribe while in the presence of the attending physician.:       I,:  Todd Yañez MD personally performed the services described in this documentation    as scribed in my presence.:                   [1]  Past Medical History:  Diagnosis Date   • Abdominal pain     LLQ   • Anxiety    • CAD (coronary artery  disease)     slight blockage   • Contact lens/glasses fitting    • Diverticulitis 8/10/2021   • Diverticulosis    • LLQ abdominal pain 8/10/2021   • Migraine    • Migraines    [2]  Past Surgical History:  Procedure Laterality Date   • BREAST SURGERY Right     lumpectomy - benign   • COLONOSCOPY     • ECTOPIC PREGNANCY SURGERY     • KNEE SURGERY Right    [3]  Family History  Problem Relation Name Age of Onset   • No Known Problems Mother     • Heart disease Father     • Substance Abuse Neg Hx     • Mental illness Neg Hx     [4]  Social History  Tobacco Use   • Smoking status: Never   • Smokeless tobacco: Never   Vaping Use   • Vaping status: Never Used   Substance Use Topics   • Alcohol use: Yes     Comment: socially   • Drug use: No   [5]    Current Outpatient Medications:   •  cyclobenzaprine (FLEXERIL) 5 mg tablet, Take 5 mg by mouth, Disp: , Rfl:   •  Diclofenac Sodium (VOLTAREN) 1 %, Apply 2 g topically 4 (four) times a day, Disp: 240 g, Rfl: 0  •  Nurtec 75 MG TBDP, , Disp: , Rfl:   •  Repatha SureClick 140 MG/ML SOAJ, every 14 (fourteen) days, Disp: , Rfl:   •  albuterol (Ventolin HFA) 90 mcg/act inhaler, Inhale 2 puffs every 6 (six) hours as needed for wheezing (Patient not taking: Reported on 7/8/2025), Disp: 18 g, Rfl: 1  •  ALPRAZolam (XANAX) 0.25 mg tablet, 1/2 to 1 tablet daily as needed (Patient not taking: Reported on 7/8/2025), Disp: 60 tablet, Rfl: 0  •  brompheniramine-pseudoephedrine-DM 30-2-10 MG/5ML syrup, Take 5 mL by mouth 4 (four) times a day as needed for congestion or cough (Patient not taking: Reported on 7/8/2025), Disp: 120 mL, Rfl: 0  •  butalbital-acetaminophen-caffeine (FIORICET,ESGIC) -40 mg per tablet, TAKE 1 TABLET BY MOUTH ONCE AS NEEDED FOR HEADACHES OR MIGRAINE FOR UP TO 15 DOSES (Patient not taking: Reported on 1/21/2025), Disp: 15 tablet, Rfl: 0  •  Galcanezumab-gnlm 120 MG/ML SOAJ, First time, administer 2 injections 2-3 hours apart and then 1 injection monthly. (Patient  not taking: Reported on 7/8/2025), Disp: 2 mL, Rfl: 3  •  LORazepam (ATIVAN) 0.5 mg tablet, Take half a tablet to one tablet by mouth daily only as needed for anxiety or sleep (Patient not taking: Reported on 7/8/2025), Disp: , Rfl:   •  Progesterone 100 MG CAPS, , Disp: , Rfl:   •  sertraline (ZOLOFT) 25 mg tablet, TAKE 1 TABLET (25 MG TOTAL) BY MOUTH DAILY (Patient not taking: Reported on 1/21/2025), Disp: 90 tablet, Rfl: 0[6]  Allergies  Allergen Reactions   • Penicillins Hives